# Patient Record
Sex: FEMALE | NOT HISPANIC OR LATINO | Employment: FULL TIME | URBAN - METROPOLITAN AREA
[De-identification: names, ages, dates, MRNs, and addresses within clinical notes are randomized per-mention and may not be internally consistent; named-entity substitution may affect disease eponyms.]

---

## 2017-01-03 ENCOUNTER — OFFICE VISIT (OUTPATIENT)
Dept: FAMILY MEDICINE | Facility: CLINIC | Age: 37
End: 2017-01-03
Payer: COMMERCIAL

## 2017-01-03 VITALS
DIASTOLIC BLOOD PRESSURE: 64 MMHG | TEMPERATURE: 99.4 F | BODY MASS INDEX: 21.22 KG/M2 | SYSTOLIC BLOOD PRESSURE: 112 MMHG | WEIGHT: 140 LBS | RESPIRATION RATE: 16 BRPM | HEART RATE: 68 BPM | HEIGHT: 68 IN

## 2017-01-03 DIAGNOSIS — N89.8 VAGINAL ITCHING: Primary | ICD-10-CM

## 2017-01-03 DIAGNOSIS — B37.31 YEAST INFECTION OF THE VAGINA: ICD-10-CM

## 2017-01-03 DIAGNOSIS — N76.0 BACTERIAL VAGINOSIS: ICD-10-CM

## 2017-01-03 DIAGNOSIS — B96.89 BACTERIAL VAGINOSIS: ICD-10-CM

## 2017-01-03 LAB
ALBUMIN UR-MCNC: NEGATIVE MG/DL
APPEARANCE UR: CLEAR
BILIRUB UR QL STRIP: NEGATIVE
COLOR UR AUTO: YELLOW
GLUCOSE UR STRIP-MCNC: NEGATIVE MG/DL
HGB UR QL STRIP: NEGATIVE
KETONES UR STRIP-MCNC: NEGATIVE MG/DL
LEUKOCYTE ESTERASE UR QL STRIP: NEGATIVE
MICRO REPORT STATUS: ABNORMAL
NITRATE UR QL: NEGATIVE
PH UR STRIP: 6.5 PH (ref 5–7)
SP GR UR STRIP: 1.01 (ref 1–1.03)
SPECIMEN SOURCE: ABNORMAL
URN SPEC COLLECT METH UR: NORMAL
UROBILINOGEN UR STRIP-ACNC: 0.2 EU/DL (ref 0.2–1)
WET PREP SPEC: ABNORMAL

## 2017-01-03 PROCEDURE — 99213 OFFICE O/P EST LOW 20 MIN: CPT | Performed by: PHYSICIAN ASSISTANT

## 2017-01-03 PROCEDURE — 87210 SMEAR WET MOUNT SALINE/INK: CPT | Performed by: PHYSICIAN ASSISTANT

## 2017-01-03 PROCEDURE — 81003 URINALYSIS AUTO W/O SCOPE: CPT | Performed by: PHYSICIAN ASSISTANT

## 2017-01-03 RX ORDER — METRONIDAZOLE 500 MG/1
500 TABLET ORAL 2 TIMES DAILY
Qty: 14 TABLET | Refills: 0 | Status: SHIPPED | OUTPATIENT
Start: 2017-01-03 | End: 2017-04-06

## 2017-01-03 RX ORDER — FLUCONAZOLE 150 MG/1
150 TABLET ORAL ONCE
Qty: 1 TABLET | Refills: 0 | Status: SHIPPED | OUTPATIENT
Start: 2017-01-03 | End: 2017-01-03

## 2017-01-03 NOTE — MR AVS SNAPSHOT
"              After Visit Summary   1/3/2017    Jenny Alcazar    MRN: [id number]           Patient Information     Date Of Birth          1980        Visit Information        Provider Department      1/3/2017 4:00 PM Edwige Rosas PA-C [facility]        Today's Diagnoses     Vaginal itching    -  1     Yeast infection of the vagina         Bacterial vaginosis            Follow-ups after your visit        Follow-up notes from your care team     Return if symptoms worsen or fail to improve.      Who to contact     If you have questions or need follow up information about today's clinic visit or your schedule please contact [facility] directly at [phone].  Normal or non-critical lab and imaging results will be communicated to you by MyChart, letter or phone within 4 business days after the clinic has received the results. If you do not hear from us within 7 days, please contact the clinic through MyChart or phone. If you have a critical or abnormal lab result, we will notify you by phone as soon as possible.  Submit refill requests through [employer] or call your pharmacy and they will forward the refill request to us. Please allow 3 business days for your refill to be completed.          Additional Information About Your Visit        MyChart Information     [employer] lets you send messages to your doctor, view your test results, renew your prescriptions, schedule appointments and more. To sign up, go to [email or web address] . Click on \"Log in\" on the left side of the screen, which will take you to the Welcome page. Then click on \"Sign up Now\" on the right side of the page.     You will be asked to enter the access code listed below, as well as some personal information. Please follow the directions to create your username and password.     Your access code is: [email or web address]  Expires: 4/3/2017  5:08 PM     Your access code will  in 90 days. If you need help " "or a new code, please call your [place] clinic or [phone].        Care EveryWhere ID     This is your Care EveryWhere ID. This could be used by other organizations to access your [place] medical records  [id number]        Your Vitals Were     Pulse Temperature Respirations Height BMI (Body Mass Index) Last Period    68 99.4  F (37.4  C) 16 5' 8\" (1.727 m) 21.29 kg/m2 12/25/2016 (Approximate)       Blood Pressure from Last 3 Encounters:   01/03/17 112/64   01/13/16 115/70   08/27/15 109/70    Weight from Last 3 Encounters:   01/03/17 140 lb (63.504 kg)   01/13/16 137 lb (62.143 kg)   08/27/15 135 lb (61.236 kg)              We Performed the Following     *UA reflex to Microscopic     Wet prep          Today's Medication Changes          These changes are accurate as of: 1/3/17  5:08 PM.  If you have any questions, ask your nurse or doctor.               Start taking these medicines.        Dose/Directions    fluconazole 150 MG tablet   Commonly known as:  DIFLUCAN   Used for:  Yeast infection of the vagina   Started by:  Edwige Rosas PA-C        Dose:  150 mg   Take 1 tablet (150 mg) by mouth once for 1 dose   Quantity:  1 tablet   Refills:  0       metroNIDAZOLE 500 MG tablet   Commonly known as:  FLAGYL   Used for:  Bacterial vaginosis   Started by:  Edwige Rosas PA-C        Dose:  500 mg   Take 1 tablet (500 mg) by mouth 2 times daily   Quantity:  14 tablet   Refills:  0            Where to get your medicines      These medications were sent to [facility]/pharmacy #[id number] - [place], [place] - [address]  [address] [facility] [place] [address]     Phone:  [phone]    - fluconazole 150 MG tablet  - metroNIDAZOLE 500 MG tablet             Primary Care Provider Office Phone # Fax #    MEÑO Jaquez [phone]       [address]  [place] [address]        Thank you!     Thank you for choosing [facility] [place]  " for your care. Our goal is always to provide you with excellent care. Hearing back from our patients is one way we can continue to improve our services. Please take a few minutes to complete the written survey that you may receive in the mail after your visit with us. Thank you!             Your Updated Medication List - Protect others around you: Learn how to safely use, store and throw away your medicines at www.disposemymeds.org.          This list is accurate as of: 1/3/17  5:08 PM.  Always use your most recent med list.                   Brand Name Dispense Instructions for use    ferrous fumarate 65 mg ([place]. FE)-Vitamin C 125 mg  MG Tabs tablet    VITRON C    180 tablet    Take 1 tablet by mouth 2 times daily       fluconazole 150 MG tablet    DIFLUCAN    1 tablet    Take 1 tablet (150 mg) by mouth once for 1 dose       metroNIDAZOLE 500 MG tablet    FLAGYL    14 tablet    Take 1 tablet (500 mg) by mouth 2 times daily       QC WOMENS DAILY MULTIvitamin Tabs     90 tablet    Take 1 tablet by mouth daily

## 2017-01-03 NOTE — PROGRESS NOTES
SUBJECTIVE:                                                    Jenny Alcazar is a 36 year old female who presents to clinic today for the following health issues:      Vaginal Symptoms     Onset: 2-3 weeks     Description:  Vaginal Discharge: white -   Itching (Pruritis): YES  Burning sensation:  YES  Odor: YES    Accompanying Signs & Symptoms:  Pain with Urination: minimal   Abdominal Pain: no   Fever: no   History:   Sexually active: no   New Partner: no   Possibility of Pregnancy:  No    Precipitating factors:   Recent Antibiotic Use: no     Alleviating factors:  none     Therapies Tried and outcome: none        -------------------------------------    Problem list and histories reviewed & adjusted, as indicated.  Additional history: as documented    Patient Active Problem List   Diagnosis     CARDIOVASCULAR SCREENING; LDL GOAL LESS THAN 160     Iron deficiency anemia     Past Surgical History   Procedure Laterality Date     Hemorrhoid surgery         Social History   Substance Use Topics     Smoking status: Never Smoker      Smokeless tobacco: Never Used     Alcohol Use: No     Family History   Problem Relation Age of Onset     Hypertension Father          Current Outpatient Prescriptions   Medication Sig Dispense Refill     fluconazole (DIFLUCAN) 150 MG tablet Take 1 tablet (150 mg) by mouth once for 1 dose 1 tablet 0     metroNIDAZOLE (FLAGYL) 500 MG tablet Take 1 tablet (500 mg) by mouth 2 times daily 14 tablet 0     ferrous fumarate 65 mg, [place]. FE,-Vitamin C 125 mg (VITRON C)  MG TABS Take 1 tablet by mouth 2 times daily 180 tablet 1     Multiple Vitamins-Minerals (QC WOMENS DAILY MULTIVITAMIN) TABS Take 1 tablet by mouth daily 90 tablet 1     No Known Allergies  Labs reviewed in EPIC  Problem list, Medication list, Allergies, and Medical/Social/Surgical histories reviewed in [facility] and updated as appropriate.    Social History     Social History     Marital Status:      Spouse Name:       "Number of Children: 0     Years of Education: N/A     Occupational History     production line      Social History Main Topics     Smoking status: Never Smoker      Smokeless tobacco: Never Used     Alcohol Use: No     Drug Use: No     Sexual Activity: No      Comment: virgin     Other Topics Concern     None     Social History Narrative       10 point review of systems negative other than symptoms noted above.   Constitutional, HEENT, CV, pulmonary, GI, , MS, Endo, Psych systems are all negative, except as otherwise noted.       OBJECTIVE:  /64 mmHg  Pulse 68  Temp(Src) 99.4  F (37.4  C)  Resp 16  Ht 5' 8\" (1.727 m)  Wt 140 lb (63.504 kg)  BMI 21.29 kg/m2  LMP 12/25/2016 (Approximate)  CONSTITUTIONAL: Alert, well-nourished, well-groomed, NAD  RESP: Lungs CTA. No wheeze, rhonchi, rales. Normal effort on room air. Equal lung sounds bilaterally.   CV: HRRR, normal S1, S2. No MRG. No peripheral edema.  DERM: No rashes or suspicious lesions  GI: Abdomen flat. BS x 4. No TTP. No HSM or masses. No CVAT      Diagnostic Tests:  Results for orders placed or performed in visit on 01/03/17   *UA reflex to Microscopic   Result Value Ref Range    Color Urine Yellow     Appearance Urine Clear     Glucose Urine Negative NEG mg/dL    Bilirubin Urine Negative NEG    Ketones Urine Negative NEG mg/dL    Specific Gravity Urine 1.010 1.003 - 1.035    Blood Urine Negative NEG    pH Urine 6.5 5.0 - 7.0 pH    Protein Albumin Urine Negative NEG mg/dL    Urobilinogen Urine 0.2 0.2 - 1.0 EU/dL    Nitrite Urine Negative NEG    Leukocyte Esterase Urine Negative NEG    Source Midstream Urine    Wet prep   Result Value Ref Range    Specimen Description Vagina     Wet Prep No Trichomonas seen  Clue cells seen  Yeast seen   (A)     Micro Report Status FINAL 01/03/2017          ASSESSMENT/PLAN:  (L29.8) Vaginal itching  (primary encounter diagnosis)  Comment:   Plan: *UA reflex to Microscopic, Wet prep            (B37.3) Yeast " infection of the vagina  Comment:   Plan: fluconazole (DIFLUCAN) 150 MG tablet            (N76.0,  B96.89) Bacterial vaginosis  Comment:   Plan: metroNIDAZOLE (FLAGYL) 500 MG tablet              FOLLOW-UP: Routinely and sooner as needed.  The patient agrees with this assessment and plan and agrees to call or return to the clinic with any questions or concerns or if their condition worsens.    SHERRI Medellin, PA-C  [facility]

## 2017-01-03 NOTE — NURSING NOTE
"Chief Complaint   Patient presents with     Vaginal Problem       Initial /64 mmHg  Pulse 68  Temp(Src) 99.4  F (37.4  C)  Resp 16  Ht 5' 8\" (1.727 m)  Wt 140 lb (63.504 kg)  BMI 21.29 kg/m2  LMP 12/25/2016 (Approximate) Estimated body mass index is 21.29 kg/(m^2) as calculated from the following:    Height as of this encounter: 5' 8\" (1.727 m).    Weight as of this encounter: 140 lb (63.504 kg).  BP completed using cuff size: regular. EVERETTE Romero LPN      "

## 2017-04-06 ENCOUNTER — OFFICE VISIT (OUTPATIENT)
Dept: FAMILY MEDICINE | Facility: CLINIC | Age: 37
End: 2017-04-06
Payer: COMMERCIAL

## 2017-04-06 ENCOUNTER — RADIANT APPOINTMENT (OUTPATIENT)
Dept: GENERAL RADIOLOGY | Facility: CLINIC | Age: 37
End: 2017-04-06
Attending: FAMILY MEDICINE
Payer: COMMERCIAL

## 2017-04-06 VITALS
HEART RATE: 72 BPM | WEIGHT: 139 LBS | SYSTOLIC BLOOD PRESSURE: 104 MMHG | TEMPERATURE: 98.5 F | DIASTOLIC BLOOD PRESSURE: 60 MMHG | BODY MASS INDEX: 21.13 KG/M2

## 2017-04-06 DIAGNOSIS — M54.6 THORACIC BACK PAIN, UNSPECIFIED BACK PAIN LATERALITY, UNSPECIFIED CHRONICITY: ICD-10-CM

## 2017-04-06 DIAGNOSIS — D50.9 IRON DEFICIENCY ANEMIA, UNSPECIFIED IRON DEFICIENCY ANEMIA TYPE: ICD-10-CM

## 2017-04-06 DIAGNOSIS — Z91.018 FOOD ALLERGY: ICD-10-CM

## 2017-04-06 DIAGNOSIS — K12.0 APHTHOUS ULCER: Primary | ICD-10-CM

## 2017-04-06 LAB
HGB BLD-MCNC: 10.5 G/DL (ref 11.7–15.7)
VIT B12 SERPL-MCNC: 508 PG/ML (ref 193–986)

## 2017-04-06 PROCEDURE — 83550 IRON BINDING TEST: CPT | Performed by: FAMILY MEDICINE

## 2017-04-06 PROCEDURE — 99214 OFFICE O/P EST MOD 30 MIN: CPT | Performed by: FAMILY MEDICINE

## 2017-04-06 PROCEDURE — 86003 ALLG SPEC IGE CRUDE XTRC EA: CPT | Performed by: FAMILY MEDICINE

## 2017-04-06 PROCEDURE — 85018 HEMOGLOBIN: CPT | Performed by: FAMILY MEDICINE

## 2017-04-06 PROCEDURE — 83540 ASSAY OF IRON: CPT | Performed by: FAMILY MEDICINE

## 2017-04-06 PROCEDURE — 36415 COLL VENOUS BLD VENIPUNCTURE: CPT | Performed by: FAMILY MEDICINE

## 2017-04-06 PROCEDURE — 82607 VITAMIN B-12: CPT | Performed by: FAMILY MEDICINE

## 2017-04-06 PROCEDURE — 82306 VITAMIN D 25 HYDROXY: CPT | Performed by: FAMILY MEDICINE

## 2017-04-06 PROCEDURE — 72072 X-RAY EXAM THORAC SPINE 3VWS: CPT

## 2017-04-06 PROCEDURE — 84207 ASSAY OF VITAMIN B-6: CPT | Mod: 90 | Performed by: FAMILY MEDICINE

## 2017-04-06 PROCEDURE — 99000 SPECIMEN HANDLING OFFICE-LAB: CPT | Performed by: FAMILY MEDICINE

## 2017-04-06 RX ORDER — TRIAMCINOLONE ACETONIDE 0.1 %
PASTE (GRAM) DENTAL 2 TIMES DAILY
Qty: 5 G | Refills: 1 | Status: SHIPPED | OUTPATIENT
Start: 2017-04-06 | End: 2017-07-06

## 2017-04-06 RX ORDER — FERROUS SULFATE 325(65) MG
325 TABLET ORAL
Qty: 30 TABLET | Refills: 11 | Status: SHIPPED | OUTPATIENT
Start: 2017-04-06 | End: 2017-04-13

## 2017-04-06 NOTE — NURSING NOTE
"Chief Complaint   Patient presents with     Mouth Problem     Back Pain       Initial /60  Pulse 72  Temp 98.5  F (36.9  C) (Tympanic)  Wt 139 lb (63 kg)  LMP 03/16/2017 (Approximate)  BMI 21.13 kg/m2 Estimated body mass index is 21.13 kg/(m^2) as calculated from the following:    Height as of 1/3/17: 5' 8\" (1.727 m).    Weight as of this encounter: 139 lb (63 kg).  Medication Reconciliation: complete    No current outpatient prescriptions on file.       Medardo ALVARADO CMA  "

## 2017-04-06 NOTE — MR AVS SNAPSHOT
"              After Visit Summary   2017    Jenny Alcazar    MRN: [id number]           Patient Information     Date Of Birth          1980        Visit Information        Provider Department      2017 2:00 PM Wesley Sutton MD [facility] [place]        Today's Diagnoses     Aphthous ulcer    -  1    Food allergy        Thoracic back pain, unspecified back pain laterality, unspecified chronicity        Iron deficiency anemia, unspecified iron deficiency anemia type           Follow-ups after your visit        Who to contact     If you have questions or need follow up information about today's clinic visit or your schedule please contact [facility] directly at [phone].  Normal or non-critical lab and imaging results will be communicated to you by MyChart, letter or phone within 4 business days after the clinic has received the results. If you do not hear from us within 7 days, please contact the clinic through MyChart or phone. If you have a critical or abnormal lab result, we will notify you by phone as soon as possible.  Submit refill requests through [employer] or call your pharmacy and they will forward the refill request to us. Please allow 3 business days for your refill to be completed.          Additional Information About Your Visit        MyChart Information     [employer] lets you send messages to your doctor, view your test results, renew your prescriptions, schedule appointments and more. To sign up, go to [email or web address] . Click on \"Log in\" on the left side of the screen, which will take you to the Welcome page. Then click on \"Sign up Now\" on the right side of the page.     You will be asked to enter the access code listed below, as well as some personal information. Please follow the directions to create your username and password.     Your access code is: [email or web address]  Expires: 2017  3:23 PM     Your access code will  in 90 days. If you need help " or a new code, please call your [facility] or [phone].        Care EveryWhere ID     This is your Care EveryWhere ID. This could be used by other organizations to access your [place] medical records  [id number]        Your Vitals Were     Pulse Temperature Last Period BMI (Body Mass Index)          72 98.5  F (36.9  C) (Tympanic) 03/16/2017 (Approximate) 21.13 kg/m2         Blood Pressure from Last 3 Encounters:   04/06/17 104/60   01/03/17 112/64   01/13/16 115/70    Weight from Last 3 Encounters:   04/06/17 139 lb (63 kg)   01/03/17 140 lb (63.5 kg)   01/13/16 137 lb (62.1 kg)              We Performed the Following     Allergy adult food panel     Hemoglobin     Iron and iron binding capacity     Vitamin B12     Vitamin B6     Vitamin D Deficiency          Today's Medication Changes          These changes are accurate as of: 4/6/17  3:23 PM.  If you have any questions, ask your nurse or doctor.               Start taking these medicines.        Dose/Directions    ferrous sulfate 325 (65 FE) MG tablet   Commonly known as:  IRON   Used for:  Iron deficiency anemia, unspecified iron deficiency anemia type   Started by:  Wesley Sutton MD        Dose:  325 mg   Take 1 tablet (325 mg) by mouth daily (with breakfast)   Quantity:  30 tablet   Refills:  11       triamcinolone 0.1 % paste   Commonly known as:  KENALOG   Used for:  Aphthous ulcer   Started by:  Wesley Sutton MD        Take by mouth 2 times daily   Quantity:  5 g   Refills:  1            Where to get your medicines      These medications were sent to [facility]/pharmacy #[id number] - [place], [place] - [address]  [address] [place] [address] [place] [address]     Phone:  [phone]     ferrous sulfate 325 (65 FE) MG tablet    triamcinolone 0.1 % paste                Primary Care Provider Office Phone # Fax #    Wesley Sutton [phone]       [facility] [address] [place] [address] [address]  [place] [address]        Thank you!      Thank you for choosing [facility] [place]  for your care. Our goal is always to provide you with excellent care. Hearing back from our patients is one way we can continue to improve our services. Please take a few minutes to complete the written survey that you may receive in the mail after your visit with us. Thank you!             Your Updated Medication List - Protect others around you: Learn how to safely use, store and throw away your medicines at www.disposemymeds.org.          This list is accurate as of: 4/6/17  3:23 PM.  Always use your most recent med list.                   Brand Name Dispense Instructions for use    ferrous sulfate 325 (65 FE) MG tablet    IRON    30 tablet    Take 1 tablet (325 mg) by mouth daily (with breakfast)       triamcinolone 0.1 % paste    KENALOG    5 g    Take by mouth 2 times daily

## 2017-04-06 NOTE — PROGRESS NOTES
SUBJECTIVE:                                                    Jenny Alcazar is a 36 year old female who presents to clinic today for the following health issues:    Concern - mouth sores     Onset: ? 4 months    Some time she feel when he eat some spreads in the am she develop some ulcers. Some time they are very painful.    She has troed mouth wash and slat water gargels    Description:   Blisters in mouth/lips    Intensity: mild    Progression of Symptoms:  waxing and waning         Therapies Tried and outcome: warm salt water       Back Pain  Duration of complaint: 2 months   Pain in the upper thoracic area, no numbness and tingling  Feels some time when she bend it cause pain, no fall or trauma, no weakness     Problem list and histories reviewed & adjusted, as indicated.          Reviewed and updated as needed this visit by clinical staff  Tobacco  Allergies  Meds       Reviewed and updated as needed this visit by Provider         ROS:  C: NEGATIVE for fever, chills, change in weight  E/M: NEGATIVE for ear, mouth and throat problems  R: NEGATIVE for significant cough or SOB  CV: NEGATIVE for chest pain, palpitations or peripheral edema  MUSCULOSKELETAL: back pain.    OBJECTIVE:                                                    /60  Pulse 72  Temp 98.5  F (36.9  C) (Tympanic)  Wt 139 lb (63 kg)  LMP 03/16/2017 (Approximate)  BMI 21.13 kg/m2  Body mass index is 21.13 kg/(m^2).   GENERAL: healthy, alert, well nourished, well hydrated, no distress  NECK: no tenderness, no adenopathy, no asymmetry, no masses, no stiffness; thyroid- normal to palpation  RESP: lungs clear to auscultation - no rales, no rhonchi, no wheezes  CV: regular rates and rhythm, normal S1 S2, no S3 or S4 and no murmur, no click or rub -  BACK: no CVA tenderness, no paralumbar tenderness  Some tenderness in the upper back.  Mouth has small aphthous ulcer     ASSESSMENT/PLAN:                                                         ICD-10-CM    1. Aphthous ulcer K12.0 Allergy adult food panel     triamcinolone (KENALOG) 0.1 % paste     Vitamin D Deficiency     Vitamin B12     Vitamin B6     Iron and iron binding capacity     Hemoglobin   2. Food allergy Z91.018 Allergy adult food panel   3. Thoracic back pain, unspecified back pain laterality, unspecified chronicity M54.6 XR Thoracic Spine 3 Views     Patient has small aphthous ulcers which are healing, suggested mouth hygiene and avoid food which aggravate the symptoms.  Will get some labs including food allergy panel along with vit and will follow up on them.  thoracic pain most likely positional she has some degree of bony tenderness will get xray and follow up on that. dicussed stretching exeresis along with the ibuprofen as needed.  Xray reviewed, which is normal. No acute abnormality seen.  Hemoglobin indicate levels of 10.2, which is low but stable. advice iron tab daily, increase green vegetables. Rest of the labs are pending, will follow up on that.    Wesley Sutton MD  [facility]

## 2017-04-07 LAB
DEPRECATED CALCIDIOL+CALCIFEROL SERPL-MC: 10 UG/L (ref 20–75)
IRON SATN MFR SERPL: 6 % (ref 15–46)
IRON SERPL-MCNC: 23 UG/DL (ref 35–180)
TIBC SERPL-MCNC: 410 UG/DL (ref 240–430)

## 2017-04-08 LAB — VIT B6 SERPL-MCNC: 43.9 NG/ML

## 2017-04-10 LAB
CLAM IGE QN: ABNORMAL KU(A)/L
CODFISH IGE QN: ABNORMAL KU(A)/L
CORN IGE QN: ABNORMAL KU(A)/L
COW MILK IGE QN: 0.17 KU/L
EGG WHITE IGE QN: ABNORMAL KU(A)/L
PEANUT IGE QN: ABNORMAL KU(A)/L
SCALLOP IGE QN: ABNORMAL KU(A)/L
SHRIMP IGE QN: ABNORMAL KU(A)/L
SOYBEAN IGE QN: ABNORMAL KU(A)/L
WALNUT IGE QN: ABNORMAL KU(A)/L
WHEAT IGE QN: ABNORMAL KU(A)/L

## 2017-04-13 ENCOUNTER — OFFICE VISIT (OUTPATIENT)
Dept: FAMILY MEDICINE | Facility: CLINIC | Age: 37
End: 2017-04-13
Payer: COMMERCIAL

## 2017-04-13 VITALS
BODY MASS INDEX: 21.29 KG/M2 | DIASTOLIC BLOOD PRESSURE: 60 MMHG | HEART RATE: 56 BPM | WEIGHT: 140 LBS | SYSTOLIC BLOOD PRESSURE: 96 MMHG | TEMPERATURE: 98.3 F

## 2017-04-13 DIAGNOSIS — E55.9 VITAMIN D DEFICIENCY: ICD-10-CM

## 2017-04-13 DIAGNOSIS — D50.9 IRON DEFICIENCY ANEMIA, UNSPECIFIED IRON DEFICIENCY ANEMIA TYPE: Primary | ICD-10-CM

## 2017-04-13 PROCEDURE — 99213 OFFICE O/P EST LOW 20 MIN: CPT | Performed by: FAMILY MEDICINE

## 2017-04-13 RX ORDER — FERROUS GLUCONATE 324(38)MG
324 TABLET ORAL
Qty: 100 TABLET | Refills: 3 | Status: SHIPPED | OUTPATIENT
Start: 2017-04-13 | End: 2017-10-11

## 2017-04-13 NOTE — PROGRESS NOTES
SUBJECTIVE:                                                    Jenny Alcazar is a 36 year old female who presents to clinic today for the following health issues:      Concern - discuss test results - hasnt started iron tab yet        Patient came today for lab follow up, she has complains of fatigue and tiredness, her hb  labwas done along with vit D and iron levels.  She denies nay other complains.  Blood allergy pane was done as , it came back faintly positive for diary but she denies nay issues with such products.    Problem list and histories reviewed & adjusted, as indicated.      Patient Active Problem List   Diagnosis     CARDIOVASCULAR SCREENING; LDL GOAL LESS THAN 160     Iron deficiency anemia     Vitamin D deficiency     Past Surgical History:   Procedure Laterality Date     HEMORRHOID SURGERY         Social History   Substance Use Topics     Smoking status: Never Smoker     Smokeless tobacco: Never Used     Alcohol use No     Family History   Problem Relation Age of Onset     Hypertension Father          Current Outpatient Prescriptions   Medication Sig Dispense Refill     cholecalciferol (VITAMIN D) 1000 UNIT tablet Take 6 tablets (6,000 Units) by mouth daily For 6 weeks then 2 tab daily after that 100 tablet 3     ferrous gluconate (FERGON) 324 (38 FE) MG tablet Take 1 tablet (324 mg) by mouth daily (with breakfast) 100 tablet 3     triamcinolone (KENALOG) 0.1 % paste Take by mouth 2 times daily (Patient not taking: Reported on 4/13/2017) 5 g 1       Reviewed and updated as needed this visit by clinical staff  Tobacco  Allergies  Meds       Reviewed and updated as needed this visit by Provider         ROS:  C: NEGATIVE for fever, chills, change in weight  E/M: NEGATIVE for ear, mouth and throat problems  R: NEGATIVE for significant cough or SOB  CV: NEGATIVE for chest pain, palpitations or peripheral edema    OBJECTIVE:                                                    BP 96/60  Pulse 56  Temp  98.3  F (36.8  C) (Tympanic)  Wt 140 lb (63.5 kg)  LMP 04/08/2017 (Exact Date)  BMI 21.29 kg/m2  Body mass index is 21.29 kg/(m^2).   GENERAL: healthy, alert, well nourished, well hydrated, no distress  HENT: ear canals- normal; TMs- normal; Nose- normal; Mouth- no ulcers, no lesions  NECK: no tenderness, no adenopathy, no asymmetry, no masses, no stiffness; thyroid- normal to palpation  RESP: lungs clear to auscultation - no rales, no rhonchi, no wheezes  CV: regular rates and rhythm, normal S1 S2, no S3 or S4 and no murmur, no click or rub -         ASSESSMENT/PLAN:                                                        ICD-10-CM    1. Iron deficiency anemia, unspecified iron deficiency anemia type D50.9 ferrous gluconate (FERGON) 324 (38 FE) MG tablet   2. Vitamin D deficiency E55.9 cholecalciferol (VITAMIN D) 1000 UNIT tablet   Dicussed labs with the patient. Her vit D is low along with low hemoglobin and iron levels, she is advised to start taking iron and vit D.  Follow up in 6-8 weeks to see if any improvement, avoid spicy food and see if any particular food cause any mouth ulcer symptoms.    Wesley Sutton MD  [facility]

## 2017-04-13 NOTE — NURSING NOTE
"Chief Complaint   Patient presents with     Results       Initial BP 96/60  Pulse 56  Temp 98.3  F (36.8  C) (Tympanic)  Wt 140 lb (63.5 kg)  LMP 04/08/2017 (Exact Date)  BMI 21.29 kg/m2 Estimated body mass index is 21.29 kg/(m^2) as calculated from the following:    Height as of 1/3/17: 5' 8\" (1.727 m).    Weight as of this encounter: 140 lb (63.5 kg).  Medication Reconciliation: complete    Current Outpatient Prescriptions   Medication Sig Dispense Refill     triamcinolone (KENALOG) 0.1 % paste Take by mouth 2 times daily (Patient not taking: Reported on 4/13/2017) 5 g 1     ferrous sulfate (IRON) 325 (65 FE) MG tablet Take 1 tablet (325 mg) by mouth daily (with breakfast) (Patient not taking: Reported on 4/13/2017) 30 tablet 11       Medardo ALVARADO CMA  "

## 2017-04-13 NOTE — MR AVS SNAPSHOT
"              After Visit Summary   2017    Jenny Alcazar    MRN: [id number]           Patient Information     Date Of Birth          1980        Visit Information        Provider Department      2017 2:00 PM Wesley Sutton MD [facility] [place]        Today's Diagnoses     Iron deficiency anemia, unspecified iron deficiency anemia type    -  1    Vitamin D deficiency           Follow-ups after your visit        Who to contact     If you have questions or need follow up information about today's clinic visit or your schedule please contact [facility] [place] directly at [phone].  Normal or non-critical lab and imaging results will be communicated to you by Glovicohart, letter or phone within 4 business days after the clinic has received the results. If you do not hear from us within 7 days, please contact the clinic through Glovicohart or phone. If you have a critical or abnormal lab result, we will notify you by phone as soon as possible.  Submit refill requests through ServiceFrame or call your pharmacy and they will forward the refill request to us. Please allow 3 business days for your refill to be completed.          Additional Information About Your Visit        MyChart Information     ServiceFrame lets you send messages to your doctor, view your test results, renew your prescriptions, schedule appointments and more. To sign up, go to [email or web address] . Click on \"Log in\" on the left side of the screen, which will take you to the Welcome page. Then click on \"Sign up Now\" on the right side of the page.     You will be asked to enter the access code listed below, as well as some personal information. Please follow the directions to create your username and password.     Your access code is: [email or web address]  Expires: 2017  3:23 PM     Your access code will  in 90 days. If you need help or a new code, please call your [facility] or [phone].        Care EveryWhere ID     " This is your Care EveryWhere ID. This could be used by other organizations to access your [place] medical records  [id number]        Your Vitals Were     Pulse Temperature Last Period BMI (Body Mass Index)          56 98.3  F (36.8  C) (Tympanic) 04/08/2017 (Exact Date) 21.29 kg/m2         Blood Pressure from Last 3 Encounters:   04/13/17 96/60   04/06/17 104/60   01/03/17 112/64    Weight from Last 3 Encounters:   04/13/17 140 lb (63.5 kg)   04/06/17 139 lb (63 kg)   01/03/17 140 lb (63.5 kg)              Today, you had the following     No orders found for display         Today's Medication Changes          These changes are accurate as of: 4/13/17  2:13 PM.  If you have any questions, ask your nurse or doctor.               Start taking these medicines.        Dose/Directions    cholecalciferol 1000 UNIT tablet   Commonly known as:  vitamin D   Used for:  Vitamin D deficiency   Started by:  Wesley Sutton MD        Dose:  6000 Units   Take 6 tablets (6,000 Units) by mouth daily For 6 weeks then 2 tab daily after that   Quantity:  100 tablet   Refills:  3       ferrous gluconate 324 (38 FE) MG tablet   Commonly known as:  FERGON   Used for:  Iron deficiency anemia, unspecified iron deficiency anemia type   Started by:  Wesley Sutton MD        Dose:  324 mg   Take 1 tablet (324 mg) by mouth daily (with breakfast)   Quantity:  100 tablet   Refills:  3            Where to get your medicines      These medications were sent to [facility]/pharmacy #[id number] - [place], [place] - [address] [place] [address]     Phone:  [phone]     cholecalciferol 1000 UNIT tablet    ferrous gluconate 324 (38 FE) MG tablet                Primary Care Provider Office Phone # Fax #    Wesley Sutton [phone]       [address]  [place] [address]        Thank you!     Thank you for choosing [facility]  for your care. Our goal is  always to provide you with excellent care. Hearing back from our patients is one way we can continue to improve our services. Please take a few minutes to complete the written survey that you may receive in the mail after your visit with us. Thank you!             Your Updated Medication List - Protect others around you: Learn how to safely use, store and throw away your medicines at www.disposemymeds.org.          This list is accurate as of: 4/13/17  2:13 PM.  Always use your most recent med list.                   Brand Name Dispense Instructions for use    cholecalciferol 1000 UNIT tablet    vitamin D    100 tablet    Take 6 tablets (6,000 Units) by mouth daily For 6 weeks then 2 tab daily after that       ferrous gluconate 324 (38 FE) MG tablet    FERGON    100 tablet    Take 1 tablet (324 mg) by mouth daily (with breakfast)       ferrous sulfate 325 (65 FE) MG tablet    IRON    30 tablet    Take 1 tablet (325 mg) by mouth daily (with breakfast)       triamcinolone 0.1 % paste    KENALOG    5 g    Take by mouth 2 times daily

## 2017-04-26 ENCOUNTER — TELEPHONE (OUTPATIENT)
Dept: FAMILY MEDICINE | Facility: CLINIC | Age: 37
End: 2017-04-26

## 2017-04-27 NOTE — TELEPHONE ENCOUNTER
Call Regarding Preventive Health Screening Cervical/PAP    Attempt 1    Message on voicemail     Comments:       Outreach   Enriqueta Patterson

## 2017-05-02 NOTE — TELEPHONE ENCOUNTER
FYI: Patient was not understanding what the annual physical/pap is. Per patient will check in with provider.    Outreach ,  Priscilla Cabezas

## 2017-07-06 ENCOUNTER — OFFICE VISIT (OUTPATIENT)
Dept: OBGYN | Facility: CLINIC | Age: 37
End: 2017-07-06
Attending: FAMILY MEDICINE
Payer: COMMERCIAL

## 2017-07-06 ENCOUNTER — OFFICE VISIT (OUTPATIENT)
Dept: FAMILY MEDICINE | Facility: CLINIC | Age: 37
End: 2017-07-06
Payer: COMMERCIAL

## 2017-07-06 VITALS — SYSTOLIC BLOOD PRESSURE: 98 MMHG | BODY MASS INDEX: 20.98 KG/M2 | DIASTOLIC BLOOD PRESSURE: 56 MMHG | WEIGHT: 138 LBS

## 2017-07-06 VITALS
BODY MASS INDEX: 20.98 KG/M2 | SYSTOLIC BLOOD PRESSURE: 102 MMHG | WEIGHT: 138 LBS | HEART RATE: 56 BPM | DIASTOLIC BLOOD PRESSURE: 60 MMHG | OXYGEN SATURATION: 98 % | TEMPERATURE: 98.3 F

## 2017-07-06 DIAGNOSIS — E55.9 VITAMIN D DEFICIENCY: Primary | ICD-10-CM

## 2017-07-06 DIAGNOSIS — N94.10 DYSPAREUNIA, FEMALE: ICD-10-CM

## 2017-07-06 DIAGNOSIS — A09 TRAVELER'S DIARRHEA: ICD-10-CM

## 2017-07-06 DIAGNOSIS — N90.810 FEMALE CIRCUMCISION: ICD-10-CM

## 2017-07-06 DIAGNOSIS — D50.9 IRON DEFICIENCY ANEMIA, UNSPECIFIED IRON DEFICIENCY ANEMIA TYPE: ICD-10-CM

## 2017-07-06 DIAGNOSIS — Z71.1 PHYSICALLY WELL BUT WORRIED: Primary | ICD-10-CM

## 2017-07-06 LAB
ERYTHROCYTE [DISTWIDTH] IN BLOOD BY AUTOMATED COUNT: 16.1 % (ref 10–15)
HCT VFR BLD AUTO: 37.3 % (ref 35–47)
HGB BLD-MCNC: 11.9 G/DL (ref 11.7–15.7)
IRON SATN MFR SERPL: 10 % (ref 15–46)
IRON SERPL-MCNC: 36 UG/DL (ref 35–180)
MCH RBC QN AUTO: 26.3 PG (ref 26.5–33)
MCHC RBC AUTO-ENTMCNC: 31.9 G/DL (ref 31.5–36.5)
MCV RBC AUTO: 83 FL (ref 78–100)
PLATELET # BLD AUTO: 219 10E9/L (ref 150–450)
RBC # BLD AUTO: 4.52 10E12/L (ref 3.8–5.2)
TIBC SERPL-MCNC: 364 UG/DL (ref 240–430)
WBC # BLD AUTO: 3.7 10E9/L (ref 4–11)

## 2017-07-06 PROCEDURE — 36415 COLL VENOUS BLD VENIPUNCTURE: CPT | Performed by: FAMILY MEDICINE

## 2017-07-06 PROCEDURE — 82306 VITAMIN D 25 HYDROXY: CPT | Performed by: FAMILY MEDICINE

## 2017-07-06 PROCEDURE — 99214 OFFICE O/P EST MOD 30 MIN: CPT | Performed by: FAMILY MEDICINE

## 2017-07-06 PROCEDURE — 83540 ASSAY OF IRON: CPT | Performed by: FAMILY MEDICINE

## 2017-07-06 PROCEDURE — 85027 COMPLETE CBC AUTOMATED: CPT | Performed by: FAMILY MEDICINE

## 2017-07-06 PROCEDURE — 83550 IRON BINDING TEST: CPT | Performed by: FAMILY MEDICINE

## 2017-07-06 PROCEDURE — 99202 OFFICE O/P NEW SF 15 MIN: CPT | Performed by: NURSE PRACTITIONER

## 2017-07-06 RX ORDER — CIPROFLOXACIN 500 MG/1
500 TABLET, FILM COATED ORAL 2 TIMES DAILY
Qty: 6 TABLET | Refills: 0 | Status: SHIPPED | OUTPATIENT
Start: 2017-07-06 | End: 2017-10-11

## 2017-07-06 NOTE — MR AVS SNAPSHOT
"              After Visit Summary   2017    Jenny Alcazar    MRN: [id number]           Patient Information     Date Of Birth          1980        Visit Information        Provider Department      2017 3:00 PM Bindu Andrews APRN CNP [facility]        Today's Diagnoses     Physically well but worried    -  1    Female circumcision           Follow-ups after your visit        Who to contact     If you have questions or need follow up information about today's clinic visit or your schedule please contact [facility] directly at [phone].  Normal or non-critical lab and imaging results will be communicated to you by WorkCasthart, letter or phone within 4 business days after the clinic has received the results. If you do not hear from us within 7 days, please contact the clinic through WorkCasthart or phone. If you have a critical or abnormal lab result, we will notify you by phone as soon as possible.  Submit refill requests through AppJet or call your pharmacy and they will forward the refill request to us. Please allow 3 business days for your refill to be completed.          Additional Information About Your Visit        MyChart Information     AppJet lets you send messages to your doctor, view your test results, renew your prescriptions, schedule appointments and more. To sign up, go to [email or web address] . Click on \"Log in\" on the left side of the screen, which will take you to the Welcome page. Then click on \"Sign up Now\" on the right side of the page.     You will be asked to enter the access code listed below, as well as some personal information. Please follow the directions to create your username and password.     Your access code is: [email or web address]  Expires: 10/4/2017 11:37 AM     Your access code will  in 90 days. If you need help or a new code, please call your [facility] or [phone].        Care EveryWhere ID     This is your Care " EveryWhere ID. This could be used by other organizations to access your [facility] medical records  [id number]        Your Vitals Were     Last Period Breastfeeding? BMI (Body Mass Index)             07/01/2017 (Approximate) No 20.98 kg/m2          Blood Pressure from Last 3 Encounters:   07/06/17 98/56   07/06/17 102/60   04/13/17 96/60    Weight from Last 3 Encounters:   07/06/17 138 lb (62.6 kg)   07/06/17 138 lb (62.6 kg)   04/13/17 140 lb (63.5 kg)              Today, you had the following     No orders found for display         Today's Medication Changes          These changes are accurate as of: 7/6/17  3:41 PM.  If you have any questions, ask your nurse or doctor.               Start taking these medicines.        Dose/Directions    ciprofloxacin 500 MG tablet   Commonly known as:  CIPRO   Used for:  Traveler's diarrhea   Started by:  Wseley Sutton MD        Dose:  500 mg   Take 1 tablet (500 mg) by mouth 2 times daily   Quantity:  6 tablet   Refills:  0         Stop taking these medicines if you haven't already. Please contact your care team if you have questions.     triamcinolone 0.1 % paste   Commonly known as:  KENALOG   Stopped by:  Wesley Sutton MD                Where to get your medicines      These medications were sent to [facility]/pharmacy #[id number] - [place], [place] - [address] [facility]  [address] [place] [address]     Phone:  [phone]     ciprofloxacin 500 MG tablet                Primary Care Provider Office Phone # Fax #    Wesley Sutton [phone]       [address]  [place] [address]        Equal Access to Services     AGUSTÍN MIRANDA AH: Hadii kristopher Daugherty, wacourtda luqadaha, qaybta kaalmada nevaehyada, trini banks. So [facility] [phone].    ATENCIÓN: Si habla español, tiene a parker disposición servicios gratuitos de asistencia lingüística. Llame al [phone].    We comply with applicable  federal civil rights laws and [place] laws. We do not discriminate on the basis of race, color, national origin, age, disability sex, sexual orientation or gender identity.            Thank you!     Thank you for choosing [facility]  for your care. Our goal is always to provide you with excellent care. Hearing back from our patients is one way we can continue to improve our services. Please take a few minutes to complete the written survey that you may receive in the mail after your visit with us. Thank you!             Your Updated Medication List - Protect others around you: Learn how to safely use, store and throw away your medicines at www.disposemymeds.org.          This list is accurate as of: 7/6/17  3:41 PM.  Always use your most recent med list.                   Brand Name Dispense Instructions for use Diagnosis    cholecalciferol 1000 UNIT tablet    vitamin D    100 tablet    Take 6 tablets (6,000 Units) by mouth daily For 6 weeks then 2 tab daily after that    Vitamin D deficiency       ciprofloxacin 500 MG tablet    CIPRO    6 tablet    Take 1 tablet (500 mg) by mouth 2 times daily    Traveler's diarrhea       ferrous gluconate 324 (38 FE) MG tablet    FERGON    100 tablet    Take 1 tablet (324 mg) by mouth daily (with breakfast)    Iron deficiency anemia, unspecified iron deficiency anemia type

## 2017-07-06 NOTE — PROGRESS NOTES
SUBJECTIVE:                                                   Jenny Alcazar is a 36 year old female who presents to clinic today for the following health issue(s):  Patient presents with:  Vaginal Problem      HPI:  Pt here today with concerns about engaging in intercourse. [place] is [place] and she is nervous about [place]. She was  once before but was never sexually active. She is circumcised.     Patient's last menstrual period was 07/01/2017 (approximate)..   Patient is not sexually active, No obstetric history on file..  Using none for contraception.    reports that she has never smoked. She has never used smokeless tobacco.    STD testing offered?  Declined    Health maintenance updated:  yes    Today's PHQ-2 Score:   PHQ-2 ( 1999 Pfizer) 4/6/2017   Q1: Little interest or pleasure in doing things 0   Q2: Feeling down, depressed or hopeless 0   PHQ-2 Score 0     Today's PHQ-9 Score: No flowsheet data found.  Today's CHRIS-7 Score: No flowsheet data found.    Problem list and histories reviewed & adjusted, as indicated.  Additional history: as documented.    Patient Active Problem List   Diagnosis     CARDIOVASCULAR SCREENING; LDL GOAL LESS THAN 160     Iron deficiency anemia     Vitamin D deficiency     Past Surgical History:   Procedure Laterality Date     HEMORRHOID SURGERY        Social History   Substance Use Topics     Smoking status: Never Smoker     Smokeless tobacco: Never Used     Alcohol use No      Problem (# of Occurrences) Relation (Name,Age of Onset)    Hypertension (1) Father            Current Outpatient Prescriptions   Medication Sig     ciprofloxacin (CIPRO) 500 MG tablet Take 1 tablet (500 mg) by mouth 2 times daily     cholecalciferol (VITAMIN D) 1000 UNIT tablet Take 6 tablets (6,000 Units) by mouth daily For 6 weeks then 2 tab daily after that     ferrous gluconate (FERGON) 324 (38 FE) MG tablet Take 1 tablet (324 mg) by mouth daily (with breakfast)      No current facility-administered medications for this visit.      No Known Allergies    ROS:  12 point review of systems negative other than symptoms noted below.    OBJECTIVE:     BP 98/56  Wt 138 lb (62.6 kg)  LMP 07/01/2017 (Approximate)  Breastfeeding? No  BMI 20.98 kg/m2  Body mass index is 20.98 kg/(m^2).    Exam:  Constitutional:  Appearance: Well nourished, well developed alert, in no acute distress  Neurologic/Psychiatric:  Mental Status:  Oriented X3   Pelvic Exam:  External Genitalia:     Normal appearance for age, no discharge present, no tenderness present, no inflammatory lesions present, color normal. Circumcised. alfonso size opening.   Genitalia and Groin:     No rashes present, no lesions present, no areas of discoloration, no masses present     In-Clinic Test Results:  Results for orders placed or performed in visit on 07/06/17 (from the past 24 hour(s))   CBC with platelets   Result Value Ref Range    WBC 3.7 (L) 4.0 - 11.0 10e9/L    RBC Count 4.52 3.8 - 5.2 10e12/L    Hemoglobin 11.9 11.7 - 15.7 g/dL    Hematocrit 37.3 35.0 - 47.0 %    MCV 83 78 - 100 fl    MCH 26.3 (L) 26.5 - 33.0 pg    MCHC 31.9 31.5 - 36.5 g/dL    RDW 16.1 (H) 10.0 - 15.0 %    Platelet Count 219 150 - 450 10e9/L       ASSESSMENT/PLAN:                                                        ICD-10-CM    1. Physically well but worried Z71.1    2. 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 She did not tolerate either speculum or bimanual exam. Discussed she anatomically can be sexually active. This seems more of a mental component.     MEÑO Caldera [facility]

## 2017-07-06 NOTE — MR AVS SNAPSHOT
After Visit Summary   7/6/2017    Jenny Alcazar    MRN: [id number]           Patient Information     Date Of Birth          1980        Visit Information        Provider Department      7/6/2017 11:00 AM Wesley Sutton MD [facility] [place]        Today's Diagnoses     Vitamin D deficiency    -  1    Iron deficiency anemia, unspecified iron deficiency anemia type        Traveler's diarrhea        Dyspareunia, female           Follow-ups after your visit        Additional Services     OB/GYN REFERRAL       Your provider has referred you to:  G: [facility] [phone]  [email or web address]    Please be aware that coverage of these services is subject to the terms and limitations of your health insurance plan.  Call member services at your health plan with any benefit or coverage questions.      Please bring the following with you to your appointment:    (1) Any X-Rays, CTs or MRIs which have been performed.  Contact the facility where they were done to arrange for  prior to your scheduled appointment.   (2) List of current medications   (3) This referral request   (4) Any documents/labs given to you for this referral                  Who to contact     If you have questions or need follow up information about today's clinic visit or your schedule please contact [facility] [place] directly at [phone].  Normal or non-critical lab and imaging results will be communicated to you by MyChart, letter or phone within 4 business days after the clinic has received the results. If you do not hear from us within 7 days, please contact the clinic through MyChart or phone. If you have a critical or abnormal lab result, we will notify you by phone as soon as possible.  Submit refill requests through DiscountDoc or call your pharmacy and they will forward the refill request to us. Please allow 3 business days for your refill to  "be completed.          Additional Information About Your Visit        InvestingNoteharTsavo Media Information     EVRGR lets you send messages to your doctor, view your test results, renew your prescriptions, schedule appointments and more. To sign up, go to [email or web address] . Click on \"Log in\" on the left side of the screen, which will take you to the Welcome page. Then click on \"Sign up Now\" on the right side of the page.     You will be asked to enter the access code listed below, as well as some personal information. Please follow the directions to create your username and password.     Your access code is: [email or web address]  Expires: 10/4/2017 11:37 AM     Your access code will  in 90 days. If you need help or a new code, please call your [place] clinic or [phone].        Care EveryWhere ID     This is your Care EveryWhere ID. This could be used by other organizations to access your [place] medical records  [id number]        Your Vitals Were     Pulse Temperature Last Period Pulse Oximetry BMI (Body Mass Index)       56 98.3  F (36.8  C) (Tympanic) 2017 (Approximate) 98% 20.98 kg/m2        Blood Pressure from Last 3 Encounters:   17 102/60   17 96/60   17 104/60    Weight from Last 3 Encounters:   17 138 lb (62.6 kg)   17 140 lb (63.5 kg)   17 139 lb (63 kg)              We Performed the Following     CBC with platelets     Iron and iron binding capacity     OB/GYN REFERRAL     Vitamin D Deficiency          Today's Medication Changes          These changes are accurate as of: 17 11:37 AM.  If you have any questions, ask your nurse or doctor.               Start taking these medicines.        Dose/Directions    ciprofloxacin 500 MG tablet   Commonly known as:  CIPRO   Used for:  Traveler's diarrhea   Started by:  Wesley Sutton MD        Dose:  500 mg   Take 1 tablet (500 mg) by mouth 2 times daily   Quantity:  6 tablet   Refills:  0         Stop taking these medicines " if you haven't already. Please contact your care team if you have questions.     triamcinolone 0.1 % paste   Commonly known as:  KENALOG   Stopped by:  Wesley Sutton MD                Where to get your medicines      These medications were sent to [facility]/pharmacy #[id number] - [place], [place] - [address]  [address], [place] [address]     Phone:  [phone]     ciprofloxacin 500 MG tablet                Primary Care Provider Office Phone # Fax #    Wesley Sutton [phone]       [facility] [address]  [place] [address]        Equal Access to Services     AGUSTÍN MIRANDA : Hadii kristopher ku hadasho Soomaali, waaxda luqadaha, qaybta kaalmada adeegyada, trini escuderoin haysavannahn nevaeh michaels . So [facility] [phone].    ATENCIÓN: Si habla español, tiene a parker disposición servicios gratuitos de asistencia lingüística. Llame al [phone].    We comply with applicable federal civil rights laws and [place] laws. We do not discriminate on the basis of race, color, national origin, age, disability sex, sexual orientation or gender identity.            Thank you!     Thank you for choosing [facility]  for your care. Our goal is always to provide you with excellent care. Hearing back from our patients is one way we can continue to improve our services. Please take a few minutes to complete the written survey that you may receive in the mail after your visit with us. Thank you!             Your Updated Medication List - Protect others around you: Learn how to safely use, store and throw away your medicines at www.disposemymeds.org.          This list is accurate as of: 7/6/17 11:37 AM.  Always use your most recent med list.                   Brand Name Dispense Instructions for use Diagnosis    cholecalciferol 1000 UNIT tablet    vitamin D    100 tablet    Take 6 tablets (6,000 Units) by mouth daily For 6 weeks then 2 tab daily after that     Vitamin D deficiency       ciprofloxacin 500 MG tablet    CIPRO    6 tablet    Take 1 tablet (500 mg) by mouth 2 times daily    Traveler's diarrhea       ferrous gluconate 324 (38 FE) MG tablet    FERGON    100 tablet    Take 1 tablet (324 mg) by mouth daily (with breakfast)    Iron deficiency anemia, unspecified iron deficiency anemia type

## 2017-07-06 NOTE — LETTER
[place] [address] [place] [address]   [phone]      July 7, 2017      Jenny Alcazar  [address]  [place] [address]            Dear Jenny,      I have reviewed your labs.   All labs are improving inlcuding Vit D and hemoglobin and iron levels, continue the medication as dicussed. Enclosed is a copy of the results.  If you have any questions or concerns, please call the clinic at [phone] and make a follow up appointment with me.  Results for orders placed or performed in visit on 07/06/17   CBC with platelets   Result Value Ref Range    WBC 3.7 (L) 4.0 - 11.0 10e9/L    RBC Count 4.52 3.8 - 5.2 10e12/L    Hemoglobin 11.9 11.7 - 15.7 g/dL    Hematocrit 37.3 35.0 - 47.0 %    MCV 83 78 - 100 fl    MCH 26.3 (L) 26.5 - 33.0 pg    MCHC 31.9 31.5 - 36.5 g/dL    RDW 16.1 (H) 10.0 - 15.0 %    Platelet Count 219 150 - 450 10e9/L   Vitamin D Deficiency   Result Value Ref Range    Vitamin D Deficiency screening 41 20 - 75 ug/L   Iron and iron binding capacity   Result Value Ref Range    Iron 36 35 - 180 ug/dL    Iron Binding Cap 364 240 - 430 ug/dL    Iron Saturation Index 10 (L) 15 - 46 %         Sincerely,    Wesley Sutton M.D.

## 2017-07-06 NOTE — NURSING NOTE
"Chief Complaint   Patient presents with     Consult       Initial /60  Pulse 56  Temp 98.3  F (36.8  C) (Tympanic)  Wt 138 lb (62.6 kg)  LMP 07/01/2017 (Approximate)  SpO2 98%  BMI 20.98 kg/m2 Estimated body mass index is 20.98 kg/(m^2) as calculated from the following:    Height as of 1/3/17: 5' 8\" (1.727 m).    Weight as of this encounter: 138 lb (62.6 kg).  Medication Reconciliation: complete    Current Outpatient Prescriptions   Medication Sig Dispense Refill     cholecalciferol (VITAMIN D) 1000 UNIT tablet Take 6 tablets (6,000 Units) by mouth daily For 6 weeks then 2 tab daily after that (Patient not taking: Reported on 7/6/2017) 100 tablet 3     ferrous gluconate (FERGON) 324 (38 FE) MG tablet Take 1 tablet (324 mg) by mouth daily (with breakfast) (Patient not taking: Reported on 7/6/2017) 100 tablet 3     triamcinolone (KENALOG) 0.1 % paste Take by mouth 2 times daily (Patient not taking: Reported on 7/6/2017) 5 g 1       Medardo M, DESTINI  "

## 2017-07-06 NOTE — PROGRESS NOTES
SUBJECTIVE:                                                    Jenny Alcazar is a 36 year old female who presents to clinic today for the following health issues:      Concern - follow up Vit D and Iron levels - stopped meds about one month ago   She did not take mediation 10 days of ramdan, her iron was low and vit d, she was advised to take 6000 units for 6 weeks and than 2000 IU after that.  She is also taking iron tab for anemia.  [place] , asking for any medication for travelers diarrhea.  She has some issues with dyspareunia, would like to see gynecology for evaluation. She fett it was hard for partner for penetration.    Traveling to [facility] next week for 1-2 months     Problem list and histories reviewed & adjusted, as indicated.          Reviewed and updated as needed this visit by clinical staff  Tobacco  Allergies  Meds       Reviewed and updated as needed this visit by Provider         ROS:  C: NEGATIVE for fever, chills, change in weight  E/M: NEGATIVE for ear, mouth and throat problems  R: NEGATIVE for significant cough or SOB  CV: NEGATIVE for chest pain, palpitations or peripheral edema    OBJECTIVE:                                                    /60  Pulse 56  Temp 98.3  F (36.8  C) (Tympanic)  Wt 138 lb (62.6 kg)  LMP 07/01/2017 (Approximate)  SpO2 98%  BMI 20.98 kg/m2  Body mass index is 20.98 kg/(m^2).   GENERAL: healthy, alert, well nourished, well hydrated, no distress  NECK: no tenderness, no adenopathy, no asymmetry, no masses, no stiffness; thyroid- normal to palpation  RESP: lungs clear to auscultation - no rales, no rhonchi, no wheezes  CV: regular rates and rhythm, normal S1 S2, no S3 or S4 and no murmur, no click or rub -         ASSESSMENT/PLAN:                                                        ICD-10-CM    1. Vitamin D deficiency E55.9 CBC with platelets     Vitamin D Deficiency   2. Iron deficiency anemia, unspecified iron deficiency anemia type  D50.9 CBC with platelets     Vitamin D Deficiency     Iron and iron binding capacity   3. Traveler's diarrhea A09 ciprofloxacin (CIPRO) 500 MG tablet   4. Dyspareunia, female N94.10 OB/GYN REFERRAL       Hemoglobin has improved, advice continue the iron tab.  Vit d levels spending, she can continue 2000 IU daily.  Dyspareunia , suggested Gyne evaluation  Cipro as needed for travels diarrhea, do not take it unless needed. Advice hydration and use bottled or boiled water.    Wesley Sutton MD  [facility] [place]

## 2017-07-07 LAB — DEPRECATED CALCIDIOL+CALCIFEROL SERPL-MC: 41 UG/L (ref 20–75)

## 2017-10-11 ENCOUNTER — OFFICE VISIT (OUTPATIENT)
Dept: FAMILY MEDICINE | Facility: CLINIC | Age: 37
End: 2017-10-11
Payer: COMMERCIAL

## 2017-10-11 VITALS
DIASTOLIC BLOOD PRESSURE: 60 MMHG | HEART RATE: 66 BPM | WEIGHT: 142 LBS | BODY MASS INDEX: 21.52 KG/M2 | SYSTOLIC BLOOD PRESSURE: 98 MMHG | HEIGHT: 68 IN | TEMPERATURE: 98.4 F

## 2017-10-11 DIAGNOSIS — E55.9 VITAMIN D DEFICIENCY: ICD-10-CM

## 2017-10-11 DIAGNOSIS — M79.672 PAIN IN BOTH FEET: ICD-10-CM

## 2017-10-11 DIAGNOSIS — D64.9 ANEMIA, UNSPECIFIED TYPE: ICD-10-CM

## 2017-10-11 DIAGNOSIS — Z00.00 ROUTINE ADULT HEALTH MAINTENANCE: Primary | ICD-10-CM

## 2017-10-11 DIAGNOSIS — M79.671 PAIN IN BOTH FEET: ICD-10-CM

## 2017-10-11 LAB
BASOPHILS # BLD AUTO: 0 10E9/L (ref 0–0.2)
BASOPHILS NFR BLD AUTO: 0.4 %
DIFFERENTIAL METHOD BLD: ABNORMAL
EOSINOPHIL # BLD AUTO: 0.8 10E9/L (ref 0–0.7)
EOSINOPHIL NFR BLD AUTO: 13.9 %
ERYTHROCYTE [DISTWIDTH] IN BLOOD BY AUTOMATED COUNT: 13.3 % (ref 10–15)
HCT VFR BLD AUTO: 36.6 % (ref 35–47)
HGB BLD-MCNC: 11.5 G/DL (ref 11.7–15.7)
LYMPHOCYTES # BLD AUTO: 2.3 10E9/L (ref 0.8–5.3)
LYMPHOCYTES NFR BLD AUTO: 40.4 %
MCH RBC QN AUTO: 26.6 PG (ref 26.5–33)
MCHC RBC AUTO-ENTMCNC: 31.4 G/DL (ref 31.5–36.5)
MCV RBC AUTO: 85 FL (ref 78–100)
MONOCYTES # BLD AUTO: 0.7 10E9/L (ref 0–1.3)
MONOCYTES NFR BLD AUTO: 12.8 %
NEUTROPHILS # BLD AUTO: 1.9 10E9/L (ref 1.6–8.3)
NEUTROPHILS NFR BLD AUTO: 32.5 %
PLATELET # BLD AUTO: 256 10E9/L (ref 150–450)
RBC # BLD AUTO: 4.32 10E12/L (ref 3.8–5.2)
WBC # BLD AUTO: 5.7 10E9/L (ref 4–11)

## 2017-10-11 PROCEDURE — 83540 ASSAY OF IRON: CPT | Performed by: INTERNAL MEDICINE

## 2017-10-11 PROCEDURE — 36415 COLL VENOUS BLD VENIPUNCTURE: CPT | Performed by: INTERNAL MEDICINE

## 2017-10-11 PROCEDURE — 82728 ASSAY OF FERRITIN: CPT | Performed by: INTERNAL MEDICINE

## 2017-10-11 PROCEDURE — 85025 COMPLETE CBC W/AUTO DIFF WBC: CPT | Performed by: INTERNAL MEDICINE

## 2017-10-11 PROCEDURE — 82306 VITAMIN D 25 HYDROXY: CPT | Performed by: INTERNAL MEDICINE

## 2017-10-11 PROCEDURE — 99395 PREV VISIT EST AGE 18-39: CPT | Mod: 25 | Performed by: INTERNAL MEDICINE

## 2017-10-11 PROCEDURE — 83550 IRON BINDING TEST: CPT | Performed by: INTERNAL MEDICINE

## 2017-10-11 NOTE — LETTER
October 12, 2017      Jenny Ottoniel  [address]  [place] [address]        Dear ,    We are writing to inform you of your test results.    You have a mild anemia and your iron is low. I would restart your iron supplements.  Your vitamin D is in the normal range.  You could probably just take your supplement during the winter months.        Resulted Orders   CBC with platelets and differential   Result Value Ref Range    WBC 5.7 4.0 - 11.0 10e9/L    RBC Count 4.32 3.8 - 5.2 10e12/L    Hemoglobin 11.5 (L) 11.7 - 15.7 g/dL      Comment:      Results confirmed by repeat test    Hematocrit 36.6 35.0 - 47.0 %    MCV 85 78 - 100 fl    MCH 26.6 26.5 - 33.0 pg    MCHC 31.4 (L) 31.5 - 36.5 g/dL    RDW 13.3 10.0 - 15.0 %    Platelet Count 256 150 - 450 10e9/L    Diff Method Automated Method     % Neutrophils 32.5 %    % Lymphocytes 40.4 %    % Monocytes 12.8 %    % Eosinophils 13.9 %    % Basophils 0.4 %    Absolute Neutrophil 1.9 1.6 - 8.3 10e9/L    Absolute Lymphocytes 2.3 0.8 - 5.3 10e9/L    Absolute Monocytes 0.7 0.0 - 1.3 10e9/L    Absolute Eosinophils 0.8 (H) 0.0 - 0.7 10e9/L    Absolute Basophils 0.0 0.0 - 0.2 10e9/L   Iron and iron binding capacity   Result Value Ref Range    Iron 24 (L) 35 - 180 ug/dL    Iron Binding Cap 399 240 - 430 ug/dL    Iron Saturation Index 6 (L) 15 - 46 %   Ferritin   Result Value Ref Range    Ferritin 6 (L) 12 - 150 ng/mL   Vitamin D Deficiency   Result Value Ref Range    Vitamin D Deficiency screening 34 20 - 75 ug/L      Comment:      Season, race, dietary intake, and treatment affect the concentration of   25-hydroxy-Vitamin D. Values may decrease during winter months and increase   during summer months. Values 20-29 ug/L may indicate Vitamin D insufficiency   and values <20 ug/L may indicate Vitamin D deficiency.  Vitamin D determination is routinely performed by an immunoassay specific for   25 hydroxyvitamin D3.  If an individual is on vitamin D2  (ergocalciferol)   supplementation, please specify 25 OH vitamin D2 and D3 level determination by   LCMSMS test [id number].         If you have any questions or concerns, please call the clinic at the number listed above.       Sincerely,        Donna Melo MD

## 2017-10-11 NOTE — PROGRESS NOTES
SUBJECTIVE:   CC: Jenny Alcazar is an 36 year old woman who presents for preventive health visit.     Jenny lives by herself. She was  recently, her  lives in [place]. 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 She is also going to school for criminal justice.    Of note she estimates her really age is probably about 29 or 30.    Healthy Habits:    Do you get at least three servings of calcium containing foods daily (dairy, green leafy vegetables, etc.)? No     Amount of exercise or daily activities, outside of work: 0 day(s) per week - active at work     Problems taking medications regularly No    Medication side effects: No    Have you had an eye exam in the past two years? yes    Do you see a dentist twice per year? no    Do you have sleep apnea, excessive snoring or daytime drowsiness?no    Foot pain - at the end of her workday after wearing her steel toed boots, she has white patch on both of her big toes at the end. It feels sort of numb. Gradually gets better over a few hours. She feels like her work boots fit well. She's also noted some pain at the top of her right foot, especially the first few steps in the morning. She fell about a year ago, and also has some residual pain from that on the head of left metatarsal.    Concerned about painful intercourse - has never been sexually active. She went to visit her  this summer, but was not able to have intercourse. She has a history of female circumcision. She saw OB/GYN a few months ago, could not tolerate any speculum exam external genitalia and vaginal opening appeared normal.    With like labs checked for iron and vitamin D. She was previously taking the supplements but stopped a few months ago so would like to see where her levels are.    Today's PHQ-2 Score:   PHQ-2 ( 1999 Pfizer) 4/6/2017 1/13/2016   Q1: Little interest or pleasure in doing things 0 0   Q2: Feeling down, depressed or hopeless 0 0   PHQ-2 Score 0 0       Abuse:  Current or Past(Physical, Sexual or Emotional)- No  Do you feel safe in your environment - YES    Social History   Substance Use Topics     Smoking status: Never Smoker     Smokeless tobacco: Never Used     Alcohol use No     The patient does not drink >3 drinks per day nor >7 drinks per week.    Reviewed orders with patient.  Reviewed health maintenance and updated orders accordingly - Yes  Patient Active Problem List   Diagnosis     Iron deficiency anemia     Vitamin D deficiency     Estimates she was born 1986.     Female circumcision     Past Surgical History:   Procedure Laterality Date     GYN SURGERY      female circumcision      HEMORRHOID SURGERY         Social History   Substance Use Topics     Smoking status: Never Smoker     Smokeless tobacco: Never Used     Alcohol use No     Family History   Problem Relation Age of Onset     Hypertension Father          No current outpatient prescriptions on file.         Mammogram not appropriate for this patient based on age.    Pertinent mammograms are reviewed under the imaging tab.  History of abnormal Pap smear: NO - age 30- 65 PAP every 3 years recommended    Reviewed and updated as needed this visit by clinical staff  Tobacco  Allergies  Meds  Med Hx  Surg Hx  Fam Hx  Soc Hx        Reviewed and updated as needed this visit by Provider  Tobacco  Allergies  Meds  Med Hx  Surg Hx  Fam Hx  Soc Hx             ROS:  C: NEGATIVE for fever, chills, change in weight  I: NEGATIVE for worrisome rashes, moles or lesions  E: NEGATIVE for vision changes or irritation  ENT: NEGATIVE for ear, mouth and throat problems  R: NEGATIVE for significant cough or SOB  B: NEGATIVE for masses, tenderness or discharge  CV: NEGATIVE for chest pain, palpitations or peripheral edema  GI: NEGATIVE for nausea, abdominal pain, heartburn, or change in bowel habits  : NEGATIVE for unusual urinary or vaginal symptoms. Periods are regular.  M: + foot pain, NEGATIVE for significant  "arthralgias or myalgia  N: NEGATIVE for weakness, dizziness or paresthesias  P: NEGATIVE for changes in mood or affect    OBJECTIVE:   BP 98/60 (BP Location: Left arm, Patient Position: Chair, Cuff Size: Adult Regular)  Pulse 66  Temp 98.4  F (36.9  C) (Tympanic)  Ht 5' 8\" (1.727 m)  Wt 142 lb (64.4 kg)  LMP 09/16/2017  BMI 21.59 kg/m2  EXAM:  GENERAL: healthy, alert and no distress  EYES: Eyes grossly normal to inspection, PERRL and conjunctivae and sclerae normal  HENT: ear canals and TM's normal, mouth without ulcers or lesions  NECK: no adenopathy, no asymmetry, masses, or scars and thyroid normal to palpation  RESP: lungs clear to auscultation - no rales, rhonchi or wheezes  CV: regular rate and rhythm, normal S1 S2, no S3 or S4, no murmur, click or rub, no peripheral edema and peripheral pulses strong  ABDOMEN: soft, nontender, no hepatosplenomegaly, no masses and bowel sounds normal   (female): deferred  SKIN: no suspicious lesions or rashes  NEURO: Normal strength and tone, mentation intact and speech normal  PSYCH: mentation appears normal, affect normal/bright  R foot: ~1cm area of pallor on distal great toe, sensation decreased to LT. No edema. 2+ DP pulse. Mild TTP over distal 1st metatarsal. Negative squeeze test.   L foot:  ~1cm area of pallor on distal great toe, sensation decreased to LT. No edema. 2+ DP pulse. No tenderness.       ASSESSMENT/PLAN:   1. Routine adult health maintenance  Healthy woman, likely early 30s rather than late 30s.   Declined pap smear as she cannot tolerate speculum exam. Can follow up with OB-GYN for concerns about painful intercourse.   Lipids checked 2015, normal.   Declined flu shot  Given phone number for [facility] dental clinic     2. Pain in both feet  Recommended going to shoe store and making sure her work boots fit appropriately. If not improving she can see podiatry.   - PODIATRY/FOOT & ANKLE SURGERY REFERRAL    3. Anemia, unspecified type  Check labs   - CBC " "with platelets and differential  - Iron and iron binding capacity  - Ferritin    4. Vitamin D deficiency  Check labs   - Vitamin D Deficiency    COUNSELING:   Reviewed preventive health counseling, as reflected in patient instructions       Regular exercise       Healthy diet/nutrition       Osteoporosis Prevention/Bone Health         reports that she has never smoked. She has never used smokeless tobacco.    Estimated body mass index is 21.59 kg/(m^2) as calculated from the following:    Height as of this encounter: 5' 8\" (1.727 m).    Weight as of this encounter: 142 lb (64.4 kg).         Counseling Resources:  ATP IV Guidelines  Pooled Cohorts Equation Calculator  Breast Cancer Risk Calculator  FRAX Risk Assessment  ICSI Preventive Guidelines  Dietary Guidelines for Americans, 2010  USDA's MyPlate  ASA Prophylaxis  Lung CA Screening    Donna Melo MD  [facility] [place]  "

## 2017-10-11 NOTE — MR AVS SNAPSHOT
After Visit Summary   10/11/2017    Jenny Alcazar    MRN: [id number]           Patient Information     Date Of Birth          1980        Visit Information        Provider Department      10/11/2017 6:20 PM Donna Melo MD [facility] [place]        Today's Diagnoses     Pain in both feet    -  1    Screening for malignant neoplasm of cervix        Need for prophylactic vaccination and inoculation against influenza        Anemia, unspecified type        Vitamin D deficiency          Care Instructions    [facility]      Preventive Health Recommendations  Female Ages 26 - 39  Yearly exam:   See your health care provider every year in order to    Review health changes.     Discuss preventive care.      Review your medicines if you your doctor has prescribed any.    Until age 30: Get a Pap test every three years (more often if you have had an abnormal result).    After age 30: Talk to your doctor about whether you should have a Pap test every 3 years or have a Pap test with HPV screening every 5 years.   You do not need a Pap test if your uterus was removed (hysterectomy) and you have not had cancer.  You should be tested each year for STDs (sexually transmitted diseases), if you're at risk.   Talk to your provider about how often to have your cholesterol checked.  If you are at risk for diabetes, you should have a diabetes test (fasting glucose).  Shots: Get a flu shot each year. Get a tetanus shot every 10 years.   Nutrition:     Eat at least 5 servings of fruits and vegetables each day.    Eat whole-grain bread, whole-wheat pasta and brown rice instead of white grains and rice.    Talk to your provider about Calcium and Vitamin D.     Lifestyle    Exercise at least 150 minutes a week (30 minutes a day, 5 days of the week). This will help you control your weight and prevent disease.    Limit alcohol to one drink per day.    No smoking.     Wear sunscreen  "to prevent skin cancer.    See your dentist every six months for an exam and cleaning.            Follow-ups after your visit        Additional Services     PODIATRY/FOOT & ANKLE SURGERY REFERRAL       Your provider has referred you to: [facility]: [facility] [phone]   [email or web address]    Please be aware that coverage of these services is subject to the terms and limitations of your health insurance plan.  Call member services at your health plan with any benefit or coverage questions.      Please bring the following to your appointment:  >>   Any x-rays, CTs or MRIs which have been performed.  Contact the facility where they were done to arrange for  prior to your scheduled appointment.    >>   List of current medications   >>   This referral request   >>   Any documents/labs given to you for this referral                  Who to contact     If you have questions or need follow up information about today's clinic visit or your schedule please contact [facility] [place] directly at [phone].  Normal or non-critical lab and imaging results will be communicated to you by MyChart, letter or phone within 4 business days after the clinic has received the results. If you do not hear from us within 7 days, please contact the clinic through MyChart or phone. If you have a critical or abnormal lab result, we will notify you by phone as soon as possible.  Submit refill requests through MideoMe or call your pharmacy and they will forward the refill request to us. Please allow 3 business days for your refill to be completed.          Additional Information About Your Visit        MideoMe Information     [employer]Me lets you send messages to your doctor, view your test results, renew your prescriptions, schedule appointments and more. To sign up, go to [email or web address] . Click on \"Log in\" on the left side of the screen, which will take you to the Welcome page. " "Then click on \"Sign up Now\" on the right side of the page.     You will be asked to enter the access code listed below, as well as some personal information. Please follow the directions to create your username and password.     Your access code is: [email or web address]  Expires: 2018  7:00 PM     Your access code will  in 90 days. If you need help or a new code, please call your [facility] clinic or [phone].        Care EveryWhere ID     This is your Care EveryWhere ID. This could be used by other organizations to access your [facility] medical records  [id number]        Your Vitals Were     Pulse Temperature Height Last Period BMI (Body Mass Index)       66 98.4  F (36.9  C) (Tympanic) 5' 8\" (1.727 m) 2017 21.59 kg/m2        Blood Pressure from Last 3 Encounters:   10/11/17 98/60   17 98/56   17 102/60    Weight from Last 3 Encounters:   10/11/17 142 lb (64.4 kg)   17 138 lb (62.6 kg)   17 138 lb (62.6 kg)              We Performed the Following     CBC with platelets and differential     Ferritin     Iron and iron binding capacity     PODIATRY/FOOT & ANKLE SURGERY REFERRAL     Vitamin D Deficiency          Today's Medication Changes          These changes are accurate as of: 10/11/17  7:00 PM.  If you have any questions, ask your nurse or doctor.               Stop taking these medicines if you haven't already. Please contact your care team if you have questions.     cholecalciferol 1000 UNIT tablet   Commonly known as:  vitamin D   Stopped by:  Donna Melo MD           ferrous gluconate 324 (38 FE) MG tablet   Commonly known as:  FERGON   Stopped by:  Donna Melo MD                    Primary Care Provider Office Phone # Fax #    Wesley Sutton [phone]       [address]  [place] [address]        Equal Access to Services     [facility] AH: Hadii kristopher moseley hadasho Soomaali, waaxda luqadaha, qaybta kaalmada adeegyada, trini bauer " daiana cruzcristelalejandra howeRoshnirosa jocelyn. So [facility] [phone].    ATENCIÓN: Si habla español, tiene a parker disposición servicios gratuitos de asistencia lingüística. Adri al [phone].    We comply with applicable federal civil rights laws and [place] laws. We do not discriminate on the basis of race, color, national origin, age, disability, sex, sexual orientation, or gender identity.            Thank you!     [facility]. Our goal is always to provide you with excellent care. Hearing back from our patients is one way we can continue to improve our services. Please take a few minutes to complete the written survey that you may receive in the mail after your visit with us. Thank you!             Your Updated Medication List - Protect others around you: Learn how to safely use, store and throw away your medicines at www.disposemymeds.org.      Notice  As of 10/11/2017  7:00 PM    You have not been prescribed any medications.

## 2017-10-11 NOTE — PATIENT INSTRUCTIONS
[facility]      Preventive Health Recommendations  Female Ages 26 - 39  Yearly exam:   See your health care provider every year in order to    Review health changes.     Discuss preventive care.      Review your medicines if you your doctor has prescribed any.    Until age 30: Get a Pap test every three years (more often if you have had an abnormal result).    After age 30: Talk to your doctor about whether you should have a Pap test every 3 years or have a Pap test with HPV screening every 5 years.   You do not need a Pap test if your uterus was removed (hysterectomy) and you have not had cancer.  You should be tested each year for STDs (sexually transmitted diseases), if you're at risk.   Talk to your provider about how often to have your cholesterol checked.  If you are at risk for diabetes, you should have a diabetes test (fasting glucose).  Shots: Get a flu shot each year. Get a tetanus shot every 10 years.   Nutrition:     Eat at least 5 servings of fruits and vegetables each day.    Eat whole-grain bread, whole-wheat pasta and brown rice instead of white grains and rice.    Talk to your provider about Calcium and Vitamin D.     Lifestyle    Exercise at least 150 minutes a week (30 minutes a day, 5 days of the week). This will help you control your weight and prevent disease.    Limit alcohol to one drink per day.    No smoking.     Wear sunscreen to prevent skin cancer.    See your dentist every six months for an exam and cleaning.

## 2017-10-11 NOTE — NURSING NOTE
"Chief Complaint   Patient presents with     Physical       Initial BP 98/60 (BP Location: Left arm, Patient Position: Chair, Cuff Size: Adult Regular)  Pulse 66  Temp 98.4  F (36.9  C) (Tympanic)  Ht 5' 8\" (1.727 m)  Wt 142 lb (64.4 kg)  LMP 09/16/2017  BMI 21.59 kg/m2 Estimated body mass index is 21.59 kg/(m^2) as calculated from the following:    Height as of this encounter: 5' 8\" (1.727 m).    Weight as of this encounter: 142 lb (64.4 kg).  Medication Reconciliation: complete  "

## 2017-10-12 PROBLEM — N90.810 FEMALE CIRCUMCISION: Status: ACTIVE | Noted: 2017-10-12

## 2017-10-12 PROBLEM — Z60.5 AGE DISCRIMINATION: Status: ACTIVE | Noted: 2017-10-12

## 2017-10-12 LAB
DEPRECATED CALCIDIOL+CALCIFEROL SERPL-MC: 34 UG/L (ref 20–75)
FERRITIN SERPL-MCNC: 6 NG/ML (ref 12–150)
IRON SATN MFR SERPL: 6 % (ref 15–46)
IRON SERPL-MCNC: 24 UG/DL (ref 35–180)
TIBC SERPL-MCNC: 399 UG/DL (ref 240–430)

## 2017-12-24 ENCOUNTER — HEALTH MAINTENANCE LETTER (OUTPATIENT)
Age: 37
End: 2017-12-24

## 2018-05-29 DIAGNOSIS — D50.9 IRON DEFICIENCY ANEMIA, UNSPECIFIED IRON DEFICIENCY ANEMIA TYPE: ICD-10-CM

## 2018-05-30 RX ORDER — FERROUS GLUCONATE 324(38)MG
TABLET ORAL
Qty: 100 TABLET | Refills: 2 | Status: SHIPPED | OUTPATIENT
Start: 2018-05-30 | End: 2019-01-10

## 2018-05-30 NOTE — TELEPHONE ENCOUNTER
Ferrous Gluconate 324mg      Last Written Prescription Date:  4/13/17  Last Fill Quantity: 100,   # refills: 3  Last Office Visit: Kameron  Future Office visit:       Routing refill request to provider for review/approval because:  Drug not on the G, P or [facility] refill protocol or controlled substance    Anum Mccloud CMA

## 2018-07-05 ENCOUNTER — OFFICE VISIT (OUTPATIENT)
Dept: FAMILY MEDICINE | Facility: CLINIC | Age: 38
End: 2018-07-05
Payer: COMMERCIAL

## 2018-07-05 VITALS
BODY MASS INDEX: 21.52 KG/M2 | HEART RATE: 69 BPM | HEIGHT: 68 IN | TEMPERATURE: 98.8 F | DIASTOLIC BLOOD PRESSURE: 59 MMHG | WEIGHT: 142 LBS | SYSTOLIC BLOOD PRESSURE: 94 MMHG | OXYGEN SATURATION: 99 %

## 2018-07-05 DIAGNOSIS — J02.9 SORE THROAT: Primary | ICD-10-CM

## 2018-07-05 DIAGNOSIS — M54.2 NECK PAIN: ICD-10-CM

## 2018-07-05 LAB
DEPRECATED S PYO AG THROAT QL EIA: NORMAL
SPECIMEN SOURCE: NORMAL

## 2018-07-05 PROCEDURE — 87880 STREP A ASSAY W/OPTIC: CPT | Performed by: FAMILY MEDICINE

## 2018-07-05 PROCEDURE — 99214 OFFICE O/P EST MOD 30 MIN: CPT | Performed by: FAMILY MEDICINE

## 2018-07-05 PROCEDURE — 87081 CULTURE SCREEN ONLY: CPT | Performed by: FAMILY MEDICINE

## 2018-07-05 RX ORDER — NAPROXEN 500 MG/1
500 TABLET ORAL 2 TIMES DAILY PRN
Qty: 30 TABLET | Refills: 0 | Status: SHIPPED | OUTPATIENT
Start: 2018-07-05 | End: 2019-01-10

## 2018-07-05 NOTE — PROGRESS NOTES
SUBJECTIVE:   Jenny Montana is a 37 year old female who presents to clinic today for the following health issues:      Acute Illness   Acute illness concerns: Throat problem , tonsils feel sore an see spots, bad smell   Onset: 2-3 weeks     Fever: no     Chills/Sweats: no     Headache (location?): no     Sinus Pressure:no    Roof of mouth pain :  YES-     Ear Pain: no    Rhinorrhea: no     Congestion: no     Sore Throat: YES sore pimple in the back of throat     Neck pain: YES back of neck , ongoing for a while      Cough: no    Wheeze: no    Decreased Appetite: no    Nausea: no    Vomiting: no    Diarrhea:  no    Dysuria/Freq.: no    Fatigue/Achiness: no    Sick/Strep Exposure: no     Therapies Tried and outcome:       PROBLEMS TO ADD ON...  Neck Pain  Duration of complaint: neck pain x 3 months  , goes across back of shoulder .   Description:   Location: as above   Radiation: none  Intensity: mild, moderate  Progression of Symptoms:  Intermittent, but concerned with ongoing sx   Accompanying Signs & Symptoms:  Burning, prickly sensation (paresthesias) in arm(s): YES  Numbness in arm(s): no  Weakness in arm(s):  no  Fever: no  Headache: YES, sometimes   Nausea and/or vomiting: no  History:   Trauma: no   Previous neck pain: no   Previous surgery or injections: no   Previous Imaging (MRI,X ray): no   Precipitating factors:   Does movement increase the pain:  no   Alleviating factors:   Therapies Tried and outcome:  Advil, sometimes does not help        Problem list and histories reviewed & adjusted, as indicated.  Additional history: as documented    Patient Active Problem List   Diagnosis     Iron deficiency anemia     Vitamin D deficiency     Estimates she was born 1986.     Female circumcision     Past Surgical History:   Procedure Laterality Date     GYN SURGERY      female circumcision      HEMORRHOID SURGERY         Social History   Substance Use Topics     Smoking status: Never Smoker     Smokeless tobacco:  "Never Used     Alcohol use No     Family History   Problem Relation Age of Onset     Hypertension Father            Reviewed and updated as needed this visit by clinical staff       Reviewed and updated as needed this visit by Provider         ROS:  Constitutional, HEENT, cardiovascular, pulmonary, GI, , musculoskeletal, neuro, skin, endocrine and psych systems are negative, except as otherwise noted.    OBJECTIVE:     BP 94/59  Pulse 69  Temp 98.8  F (37.1  C) (Tympanic)  Ht 5' 8\" (1.727 m)  Wt 142 lb (64.4 kg)  LMP 06/22/2018  SpO2 99%  BMI 21.59 kg/m2  Body mass index is 21.59 kg/(m^2).  GENERAL: healthy, alert and no distress  EYES: Eyes grossly normal to inspection, PERRL and conjunctivae and sclerae normal  HENT: normal cephalic/atraumatic, ear canals and TM's normal,  oral mucous membranes moist, Throat with mild pharyngeal erythema, has no exudate ,  no sinus  tenderness  NECK: no adenopathy, no asymmetry,  RESP: lungs clear to auscultation - no rales, rhonchi or wheezes  CV: regular rate and rhythm, normal S1 S2, no S3 or S4, no murmur,   ABDOMEN: soft, nontender, no hepatosplenomegaly, no masses and bowel sounds normal  MS: neck with slight decrease ROM and tenderness to palpation in paracervical and trapezius area bilaterally more so on rt then left , ROM aggravates pain   NEURO: Normal strength and tone, mentation intact and speech normal  PSYCH: mentation appears normal, affect normal/bright        ASSESSMENT/PLAN:     (J02.9) Sore throat  (primary encounter diagnosis)  Comment:   Plan: Strep, Rapid Screen, Beta strep group A culture          Rapid strep negative, strep culture pending. Call and treat only if positive.    She is mostly worried about white spots on and of and it sounds like minor problem with tonsillar benign calcification., so reassurance and talked about benign nature of these.  cares and symptomatic treatment discussed follow up if problem . May consider ENT evaluation if " ongoing or frequent problem       (M54.2) Neck pain  Comment:   Plan: naproxen (NAPROSYN) 500 MG tablet, [facility] PT,         HAND, AND CHIROPRACTIC REFERRAL               discussed  neck cares and symptomatic treatment including  adequate pain control, heat,  stretches etc.    she will do follow up if no improvement or problem after . Consider further evaluation and  physical therapy if needed.     Patient expressed understanding and agreement with treatment plan. All patient's questions were answered, will let me know if has more later.  Medications: Rx's: Reviewed the potential side effects/complications of medications prescribed.       Kelsea Montana MD  [facility]

## 2018-07-05 NOTE — PATIENT INSTRUCTIONS
Take medications as directed.  Referral given to PT   Cares and symptomatic cares discussed   Follow up if problem or concern

## 2018-07-05 NOTE — MR AVS SNAPSHOT
After Visit Summary   7/5/2018    Jenny Alcazar    MRN: [id number]           Patient Information     Date Of Birth          1980        Visit Information        Provider Department      7/5/2018 12:40 PM Kelsea Montana MD [facility] [place]        Today's Diagnoses     Sore throat    -  1    Neck pain          Care Instructions    Take medications as directed.  Referral given to PT   Cares and symptomatic cares discussed   Follow up if problem or concern             Follow-ups after your visit        Additional Services     [facility] PT, HAND, AND CHIROPRACTIC REFERRAL       **This order will print in the [facility] Scheduling Office**    Physical Therapy, Hand Therapy and Chiropractic Care are available through:    *[facility]  *[facility]  *[facility]    Call one number to schedule at any of the above locations: [phone].    Your provider has referred you to:     Indication/Reason for Referral: Neck Pain  Onset of Illness: ongoing x 3 months   Therapy Orders: Evaluate and Treat  Special Programs:   Special Request:     Adan Arellano      Additional Comments for the Therapist or Chiropractor:     Please be aware that coverage of these services is subject to the terms and limitations of your health insurance plan.  Call member services at your health plan with any benefit or coverage questions.      Please bring the following to your appointment:    *Your personal calendar for scheduling future appointments  *Comfortable clothing                  Who to contact     If you have questions or need follow up information about today's clinic visit or your schedule please contact [facility] [place] directly at [phone].  Normal or non-critical lab and imaging results will be communicated to you by MyChart, letter or phone within 4 business days after the clinic has received the results. If you do not hear from us within 7  "days, please contact the clinic through [employer]UB[employer] or phone. If you have a critical or abnormal lab result, we will notify you by phone as soon as possible.  Submit refill requests through [employer] or call your pharmacy and they will forward the refill request to us. Please allow 3 business days for your refill to be completed.          Additional Information About Your Visit        KUBOO Information     [employer] lets you send messages to your doctor, view your test results, renew your prescriptions, schedule appointments and more. To sign up, go to [email or web address] . Click on \"Log in\" on the left side of the screen, which will take you to the Welcome page. Then click on \"Sign up Now\" on the right side of the page.     You will be asked to enter the access code listed below, as well as some personal information. Please follow the directions to create your username and password.     Your access code is: [email or web address]  Expires: 10/1/2018  7:40 AM     Your access code will  in 90 days. If you need help or a new code, please call your [place] clinic or [phone].        Care EveryWhere ID     This is your Care EveryWhere ID. This could be used by other organizations to access your [place] medical records  [id number]        Your Vitals Were     Pulse Temperature Height Last Period Pulse Oximetry BMI (Body Mass Index)    69 98.8  F (37.1  C) (Tympanic) 5' 8\" (1.727 m) 2018 99% 21.59 kg/m2       Blood Pressure from Last 3 Encounters:   18 94/59   10/11/17 98/60   17 98/56    Weight from Last 3 Encounters:   18 142 lb (64.4 kg)   10/11/17 142 lb (64.4 kg)   17 138 lb (62.6 kg)              We Performed the Following     Beta strep group A culture     [facility] PT, HAND, AND CHIROPRACTIC REFERRAL     Strep, Rapid Screen          Today's Medication Changes          These changes are accurate as of 18  1:45 PM.  If you have any questions, ask your nurse or doctor.               Start " taking these medicines.        Dose/Directions    naproxen 500 MG tablet   Commonly known as:  NAPROSYN   Used for:  Neck pain   Started by:  Kelsea Montana MD        Dose:  500 mg   Take 1 tablet (500 mg) by mouth 2 times daily as needed for moderate pain   Quantity:  30 tablet   Refills:  0            Where to get your medicines      These medications were sent to [facility]/pharmacy #[id number] - [place], [place] - [address]  [address], [place] [address]     Phone:  [phone]     naproxen 500 MG tablet                Primary Care Provider Office Phone # Fax #    Wesley Sutton [phone]       [address]  [place] [address]        Equal Access to Services     [facility] : Mani pedroo Soashley, waaxda luqadaha, qaybta kaalmada adeegyada, trini michaels . So [facility] [phone].    ATENCIÓN: Si habla español, tiene a parker disposición servicios gratuitos de asistencia lingüística. Llame al [phone].    We comply with applicable federal civil rights laws and [place] laws. We do not discriminate on the basis of race, color, national origin, age, disability, sex, sexual orientation, or gender identity.            Thank you!     [facility]  [facility] your [facility]. Our goal is always to provide you with excellent care. Hearing back from our patients is one way we can continue to improve our services. Please take a few minutes to complete the written survey that you may receive in the mail after your visit with us. Thank you!             Your Updated Medication List - Protect others around you: Learn how to safely use, store and throw away your medicines at www.disposemymeds.org.          This list is accurate as of 7/5/18  1:45 PM.  Always use your most recent med list.                   Brand Name Dispense Instructions for use Diagnosis    ferrous gluconate 324 (38 Fe) MG tablet    FERGON    100  tablet    TAKE 1 TABLET (324 MG) BY MOUTH DAILY (WITH BREAKFAST)    Iron deficiency anemia, unspecified iron deficiency anemia type       naproxen 500 MG tablet    NAPROSYN    30 tablet    Take 1 tablet (500 mg) by mouth 2 times daily as needed for moderate pain    Neck pain

## 2018-07-06 LAB
BACTERIA SPEC CULT: NORMAL
SPECIMEN SOURCE: NORMAL

## 2018-07-16 ENCOUNTER — THERAPY VISIT (OUTPATIENT)
Dept: PHYSICAL THERAPY | Facility: CLINIC | Age: 38
End: 2018-07-16
Payer: COMMERCIAL

## 2018-07-16 DIAGNOSIS — M54.2 CERVICALGIA: Primary | ICD-10-CM

## 2018-07-16 PROCEDURE — 97161 PT EVAL LOW COMPLEX 20 MIN: CPT | Mod: GP | Performed by: PHYSICAL THERAPIST

## 2018-07-16 PROCEDURE — G8981 BODY POS CURRENT STATUS: HCPCS | Mod: GP | Performed by: PHYSICAL THERAPIST

## 2018-07-16 PROCEDURE — G8982 BODY POS GOAL STATUS: HCPCS | Mod: GP | Performed by: PHYSICAL THERAPIST

## 2018-07-16 PROCEDURE — 97110 THERAPEUTIC EXERCISES: CPT | Mod: GP | Performed by: PHYSICAL THERAPIST

## 2018-07-16 NOTE — PROGRESS NOTES
[facility] Initial Evaluation  Subjective:  Patient is a 37 year old female presenting with rehab cervical spine hpi. The history is provided by the patient.   Jenny Alcazar is a 37 year old female with a cervical spine condition.  Condition occurred with:  Insidious onset.  Condition occurred: for unknown reasons.  This is a new condition  Patient has had neck pain with radicular symptoms for 3 weeks. MD referral on 7/5/18.  .    Patient reports pain:  Cervical right side.  Radiates to:  Hand right.  Pain is described as aching and is constant and reported as 6/10.  Associated symptoms:  Loss of motion/stiffness, numbness and headache. Pain is worse in the A.M..  Symptoms are exacerbated by looking up or down Relieved by: Massage and moving the head.  Since onset symptoms are unchanged.        General health as reported by patient is good.  Pertinent medical history includes:  None.  Medical allergies: no.  Other surgeries include:  Cancer surgery.  Current medications:  None as reported by the patient.  Current occupation is Manufacturing lead  .  Patient is working in normal job without restrictions.  Primary job tasks include:  Prolonged standing.    Barriers include:  None as reported by the patient.    Red flags:  None as reported by the patient.                        Objective:  Standing Alignment:    Cervical/Thoracic:  Forward head  Shoulder/UE:  Rounded shoulders                  Flexibility/Screens:   Positive screens:  CervicalNegative screens: Shoulder                         Cervical/Thoracic Evaluation    AROM:  AROM Cervical:    Flexion:            WNL  Extension:       WNL  Rotation:         Left: WNL     Right: 90%  Side Bend:      Left: 90%     Right:  WNL      Headaches: cervical  Cervical Myotomes:  normal                      Cervical Dermatomes:  normal                    Cervical Palpation:    Tenderness present at Left:    Upper Trap  Tenderness not present at Left:    Levator; Erector Spinae or Suboccipitals  Tenderness present at Right:    Upper Trap; Levator and Erector Spinae  Tenderness not present at Right:      Suboccipitals                                                  Jerilyn Cervical Evaluation        Test Movements:    PRO: During: no effect  After: no effect  Mechanical Response: no effect  Repeat PRO: During: decreases  After: better  Mechanical Response: no effect  RET: During: no effect  After: no effect  Mechanical Response: no effect  Repeat RET: During: decreases  After: better  Mechanical Response: no effect                                                                     ROS    Assessment/Plan:    Patient is a 37 year old female with cervical complaints.    Patient has the following significant findings with corresponding treatment plan.                Diagnosis 1:  Cervicalgia  Pain -  hot/cold therapy, US, electric stimulation, mechanical traction, manual therapy, splint/taping/bracing/orthotics, self management, education, directional preference exercise and home program  Decreased ROM/flexibility - manual therapy, therapeutic exercise, therapeutic activity and home program  Impaired muscle performance - electric stimulation, neuro re-education and home program  Decreased function - therapeutic activities and home program  Impaired posture - neuro re-education, therapeutic activities and home program    Therapy Evaluation Codes:   1) History comprised of:   Personal factors that impact the plan of care:      None.    Comorbidity factors that impact the plan of care are:      None.     Medications impacting care: None.  2) Examination of Body Systems comprised of:   Body structures and functions that impact the plan of care:      Cervical spine.   Activity limitations that impact the plan of care are:      Bending, Reading/Computer work, Sitting and Working.  3) Clinical presentation characteristics  are:   Stable/Uncomplicated.  4) Decision-Making    Low complexity using standardized patient assessment instrument and/or measureable assessment of functional outcome.  Cumulative Therapy Evaluation is: Low complexity.    Previous and current functional limitations:  (See Goal Flow Sheet for this information)    Short term and Long term goals: (See Goal Flow Sheet for this information)     Communication ability:  Patient appears to be able to clearly communicate and understand verbal and written communication and follow directions correctly.  Treatment Explanation - The following has been discussed with the patient:   RX ordered/plan of care  Anticipated outcomes  Possible risks and side effects  This patient would benefit from PT intervention to resume normal activities.   Rehab potential is good.    Frequency:  1 X week, once daily  Duration:  for 6 weeks  Discharge Plan:  Achieve all LTG.  Independent in home treatment program.  Reach maximal therapeutic benefit.    Please refer to the daily flowsheet for treatment today, total treatment time and time spent performing 1:1 timed codes.

## 2018-07-16 NOTE — MR AVS SNAPSHOT
"              After Visit Summary   2018    Jenny Alcazar    MRN: [id number]           Patient Information     Date Of Birth          1980        Visit Information        Provider Department      2018 5:30 PM John Sal PT [facility] Physical Therapy        Today's Diagnoses     Cervicalgia    -  1       Follow-ups after your visit        Who to contact     If you have questions or need follow up information about today's clinic visit or your schedule please contact [facility] directly at [phone].  Normal or non-critical lab and imaging results will be communicated to you by ZMPhart, letter or phone within 4 business days after the clinic has received the results. If you do not hear from us within 7 days, please contact the clinic through [employer] or phone. If you have a critical or abnormal lab result, we will notify you by phone as soon as possible.  Submit refill requests through [employer] or call your pharmacy and they will forward the refill request to us. Please allow 3 business days for your refill to be completed.          Additional Information About Your Visit        MyChart Information     [employer] lets you send messages to your doctor, view your test results, renew your prescriptions, schedule appointments and more. To sign up, go to [email or web address] . Click on \"Log in\" on the left side of the screen, which will take you to the Welcome page. Then click on \"Sign up Now\" on the right side of the page.     You will be asked to enter the access code listed below, as well as some personal information. Please follow the directions to create your username and password.     Your access code is: [email or web address]  Expires: 10/1/2018  7:40 AM     Your access code will  in 90 days. If you need help or a new code, please call your [place] clinic or [phone].        Care EveryWhere ID     This " is your Care EveryWhere ID. This could be used by other organizations to access your [facility] medical records  [id number]        Your Vitals Were     Last Period                   06/22/2018            Blood Pressure from Last 3 Encounters:   07/05/18 94/59   10/11/17 98/60   07/06/17 98/56    Weight from Last 3 Encounters:   07/05/18 64.4 kg (142 lb)   10/11/17 64.4 kg (142 lb)   07/06/17 62.6 kg (138 lb)              We Performed the Following     HC PT EVAL, LOW COMPLEXITY     [facility] CERT REPORT     [facility] INITIAL EVAL REPORT     THERAPEUTIC EXERCISES        Primary Care Provider Office Phone # Fax #    Wesley Sutton [phone]       [address]  [place] [address]        Equal Access to Services     [facility]: Hadii aad ku hadasho Soomaali, waaxda luqadaha, qaybta kaalmada adeegyada, waxlevi michaels . So [facility] [phone].    ATENCIÓN: Si habla español, tiene a parker disposición servicios gratuitos de asistencia lingüística. Ll[facility] [phone].    We comply with applicable federal civil rights laws and [place] laws. We do not discriminate on the basis of race, color, national origin, age, disability, sex, sexual orientation, or gender identity.            Thank you!     Thank you for choosing [facility] [place] [facility]  for your care. Our goal is always to provide you with excellent care. Hearing back from our patients is one way we can continue to improve our services. Please take a few minutes to complete the written survey that you may receive in the mail after your visit with us. Thank you!             Your Updated Medication List - Protect others around you: Learn how to safely use, store and throw away your medicines at www.disposemymeds.org.          This list is accurate as of 7/16/18 11:59 PM.  Always use your most recent med list.                   Brand Name Dispense Instructions for use Diagnosis    ferrous  gluconate 324 (38 Fe) MG tablet    FERGON    100 tablet    TAKE 1 TABLET (324 MG) BY MOUTH DAILY (WITH BREAKFAST)    Iron deficiency anemia, unspecified iron deficiency anemia type       naproxen 500 MG tablet    NAPROSYN    30 tablet    Take 1 tablet (500 mg) by mouth 2 times daily as needed for moderate pain    Neck pain

## 2018-07-18 PROBLEM — M54.2 CERVICALGIA: Status: ACTIVE | Noted: 2018-07-18

## 2019-01-08 ENCOUNTER — OFFICE VISIT (OUTPATIENT)
Dept: FAMILY MEDICINE | Facility: CLINIC | Age: 39
End: 2019-01-08
Payer: COMMERCIAL

## 2019-01-08 VITALS
SYSTOLIC BLOOD PRESSURE: 94 MMHG | HEART RATE: 64 BPM | WEIGHT: 146 LBS | TEMPERATURE: 97.4 F | DIASTOLIC BLOOD PRESSURE: 60 MMHG | BODY MASS INDEX: 22.2 KG/M2

## 2019-01-08 DIAGNOSIS — R53.83 OTHER FATIGUE: ICD-10-CM

## 2019-01-08 DIAGNOSIS — M25.50 MULTIPLE JOINT PAIN: Primary | ICD-10-CM

## 2019-01-08 LAB
ERYTHROCYTE [DISTWIDTH] IN BLOOD BY AUTOMATED COUNT: 16 % (ref 10–15)
ERYTHROCYTE [SEDIMENTATION RATE] IN BLOOD BY WESTERGREN METHOD: 13 MM/H (ref 0–20)
HCT VFR BLD AUTO: 36.4 % (ref 35–47)
HGB BLD-MCNC: 11.1 G/DL (ref 11.7–15.7)
MCH RBC QN AUTO: 23.6 PG (ref 26.5–33)
MCHC RBC AUTO-ENTMCNC: 30.5 G/DL (ref 31.5–36.5)
MCV RBC AUTO: 77 FL (ref 78–100)
PLATELET # BLD AUTO: 286 10E9/L (ref 150–450)
RBC # BLD AUTO: 4.7 10E12/L (ref 3.8–5.2)
WBC # BLD AUTO: 4.6 10E9/L (ref 4–11)

## 2019-01-08 PROCEDURE — 99214 OFFICE O/P EST MOD 30 MIN: CPT | Performed by: FAMILY MEDICINE

## 2019-01-08 PROCEDURE — 36415 COLL VENOUS BLD VENIPUNCTURE: CPT | Performed by: FAMILY MEDICINE

## 2019-01-08 PROCEDURE — 86431 RHEUMATOID FACTOR QUANT: CPT | Performed by: FAMILY MEDICINE

## 2019-01-08 PROCEDURE — 83550 IRON BINDING TEST: CPT | Performed by: FAMILY MEDICINE

## 2019-01-08 PROCEDURE — 85027 COMPLETE CBC AUTOMATED: CPT | Performed by: FAMILY MEDICINE

## 2019-01-08 PROCEDURE — 86039 ANTINUCLEAR ANTIBODIES (ANA): CPT | Performed by: FAMILY MEDICINE

## 2019-01-08 PROCEDURE — 85652 RBC SED RATE AUTOMATED: CPT | Performed by: FAMILY MEDICINE

## 2019-01-08 PROCEDURE — 82306 VITAMIN D 25 HYDROXY: CPT | Performed by: FAMILY MEDICINE

## 2019-01-08 PROCEDURE — 83540 ASSAY OF IRON: CPT | Performed by: FAMILY MEDICINE

## 2019-01-08 PROCEDURE — 84443 ASSAY THYROID STIM HORMONE: CPT | Performed by: FAMILY MEDICINE

## 2019-01-08 PROCEDURE — 86038 ANTINUCLEAR ANTIBODIES: CPT | Performed by: FAMILY MEDICINE

## 2019-01-08 NOTE — PROGRESS NOTES
SUBJECTIVE:   Jenny Alcazar is a 38 year old female who presents to clinic today for the following health issues:      Generalized joint Pain    Onset: 3-4 months     Patient came today for evaluation of multiple joint pain mostly in the hands.  Overall feels fatigued and tired.  In the past her hemoglobin was low and she was advised to take iron tablets.  Currently she is not taking any medications.  Her.  Her used to be regular but now she needs to get some intermittent spotting.  Denies any abdominal pain.  No nausea no vomiting.  At the end of the day she feels more fatigued than normal.          Problem list and histories reviewed & adjusted, as indicated.  Additional history: as documented    Patient Active Problem List   Diagnosis     Iron deficiency anemia     Vitamin D deficiency     Estimates she was born 1986.     Female circumcision     Cervicalgia     Past Surgical History:   Procedure Laterality Date     GYN SURGERY      female circumcision      HEMORRHOID SURGERY         Social History     Tobacco Use     Smoking status: Never Smoker     Smokeless tobacco: Never Used   Substance Use Topics     Alcohol use: No     Alcohol/week: 0.0 oz     Family History   Problem Relation Age of Onset     Hypertension Father          Current Outpatient Medications   Medication Sig Dispense Refill     ferrous gluconate (FERGON) 324 (38 Fe) MG tablet TAKE 1 TABLET (324 MG) BY MOUTH DAILY (WITH BREAKFAST) (Patient not taking: Reported on 1/8/2019) 100 tablet 2     naproxen (NAPROSYN) 500 MG tablet Take 1 tablet (500 mg) by mouth 2 times daily as needed for moderate pain (Patient not taking: Reported on 1/8/2019) 30 tablet 0       Reviewed and updated as needed this visit by clinical staff  Tobacco  Allergies  Meds       Reviewed and updated as needed this visit by Provider         ROS:  CONSTITUTIONAL: NEGATIVE for fever, chills, change in weight  ENT/MOUTH: NEGATIVE for ear, mouth and throat problems  RESP: NEGATIVE  for significant cough or SOB  CV: NEGATIVE for chest pain, palpitations or peripheral edema    OBJECTIVE:                                                    BP 94/60   Pulse 64   Temp 97.4  F (36.3  C) (Tympanic)   Wt 66.2 kg (146 lb)   LMP 01/07/2019   BMI 22.20 kg/m    Body mass index is 22.2 kg/m .   GENERAL: healthy, alert, well nourished, well hydrated, no distress  NECK: no tenderness, no adenopathy, no asymmetry, no masses, no stiffness; thyroid- normal to palpation  RESP: lungs clear to auscultation - no rales, no rhonchi, no wheezes  CV: regular rates and rhythm, normal S1 S2, no S3 or S4 and no murmur, no click or rub -  ABDOMEN: soft, no tenderness, no  hepatosplenomegaly, no masses, normal bowel sounds  NEURO: strength and tone- normal, sensory exam- grossly normal, mentation- intact, speech- normal, reflexes- symmetric  Joints not swollen.  No point tenderness.       ASSESSMENT/PLAN:                                                        ICD-10-CM    1. Multiple joint pain M25.50 CBC with platelets     Vitamin D Deficiency     TSH     Comprehensive metabolic panel     ESR: Erythrocyte sedimentation rate     Rheumatoid factor     Anti Nuclear Shonda IgG by IFA with Reflex     Iron and iron binding capacity   2. Other fatigue R53.83 Iron and iron binding capacity       Patient in the past does have history of fatigue tiredness and some iron deficiency.  We will get some blood work including CBC and iron labs and will follow up on that.  Given small joint and symmetrical heart I would get some rheumatological workup as well to make sure there is no underlying condition.  She is advised to follow-up in 1 week to discuss results.  Meanwhile advised to continue hydration.  Take multivitamin a day.  Warning signs were discussed with the patient.    Wesley Sutton MD  [facility] [place]

## 2019-01-09 LAB
ANA PAT SER IF-IMP: ABNORMAL
ANA SER QL IF: ABNORMAL
ANA TITR SER IF: ABNORMAL {TITER}
DEPRECATED CALCIDIOL+CALCIFEROL SERPL-MC: 21 UG/L (ref 20–75)
IRON SATN MFR SERPL: 5 % (ref 15–46)
IRON SERPL-MCNC: 21 UG/DL (ref 35–180)
RHEUMATOID FACT SER NEPH-ACNC: <20 IU/ML (ref 0–20)
TIBC SERPL-MCNC: 430 UG/DL (ref 240–430)
TSH SERPL DL<=0.005 MIU/L-ACNC: 0.51 MU/L (ref 0.4–4)

## 2019-01-10 ENCOUNTER — OFFICE VISIT (OUTPATIENT)
Dept: FAMILY MEDICINE | Facility: CLINIC | Age: 39
End: 2019-01-10
Payer: COMMERCIAL

## 2019-01-10 VITALS
BODY MASS INDEX: 22.05 KG/M2 | DIASTOLIC BLOOD PRESSURE: 60 MMHG | WEIGHT: 145 LBS | SYSTOLIC BLOOD PRESSURE: 100 MMHG | HEART RATE: 60 BPM

## 2019-01-10 DIAGNOSIS — D50.9 IRON DEFICIENCY ANEMIA, UNSPECIFIED IRON DEFICIENCY ANEMIA TYPE: Primary | ICD-10-CM

## 2019-01-10 DIAGNOSIS — E55.9 VITAMIN D DEFICIENCY: ICD-10-CM

## 2019-01-10 DIAGNOSIS — R53.83 OTHER FATIGUE: ICD-10-CM

## 2019-01-10 PROCEDURE — 99213 OFFICE O/P EST LOW 20 MIN: CPT | Performed by: FAMILY MEDICINE

## 2019-01-10 RX ORDER — FERROUS GLUCONATE 324(38)MG
TABLET ORAL
Qty: 100 TABLET | Refills: 5 | Status: SHIPPED | OUTPATIENT
Start: 2019-01-10

## 2019-01-10 NOTE — PROGRESS NOTES
SUBJECTIVE:   Jneny Alcazar is a 38 year old female who presents to clinic today for the following health issues:      Concern - discuss lab results     Patient came today to discuss labs.  Apparently she has history of iron deficiency anemia and fatigue in the past.  She took iron in the past which has helped.  She stopped taking iron tablets and her fatigue tiredness came back.  She denies any other symptoms.      Problem list and histories reviewed & adjusted, as indicated.  Additional history: as documented      Patient Active Problem List   Diagnosis     Iron deficiency anemia     Vitamin D deficiency     Estimates she was born 1986.     Female circumcision     Cervicalgia     Past Surgical History:   Procedure Laterality Date     GYN SURGERY      female circumcision      HEMORRHOID SURGERY         Social History     Tobacco Use     Smoking status: Never Smoker     Smokeless tobacco: Never Used   Substance Use Topics     Alcohol use: No     Alcohol/week: 0.0 oz     Family History   Problem Relation Age of Onset     Hypertension Father          Current Outpatient Medications   Medication Sig Dispense Refill     ferrous gluconate (FERGON) 324 (38 Fe) MG tablet TAKE 1 TABLET (324 MG) BY MOUTH  2 times daily. 100 tablet 5       Reviewed and updated as needed this visit by clinical staff  Tobacco  Allergies  Meds       Reviewed and updated as needed this visit by Provider         ROS:  CONSTITUTIONAL: NEGATIVE for fever, chills, change in weight  ENT/MOUTH: NEGATIVE for ear, mouth and throat problems  RESP: NEGATIVE for significant cough or SOB  CV: NEGATIVE for chest pain, palpitations or peripheral edema    OBJECTIVE:                                                    /60   Pulse 60   Wt 65.8 kg (145 lb)   LMP 01/07/2019   BMI 22.05 kg/m    Body mass index is 22.05 kg/m .   GENERAL: healthy, alert, well nourished, well hydrated, no distress  NECK: no tenderness, no adenopathy, no asymmetry, no masses,  no stiffness; thyroid- normal to palpation  RESP: lungs clear to auscultation - no rales, no rhonchi, no wheezes  CV: regular rates and rhythm, normal S1 S2, no S3 or S4 and no murmur, no click or rub -         ASSESSMENT/PLAN:                                                        ICD-10-CM    1. Iron deficiency anemia, unspecified iron deficiency anemia type D50.9 ferrous gluconate (FERGON) 324 (38 Fe) MG tablet   2. Other fatigue R53.83      Discuss labs with the patient.  She continued to have low iron and her hemoglobin is low at MCV is also low.  She has iron deficiency anemia.  Her symptoms of fatigue tiredness could be due to that.  I would suggest to start taking iron tablets morning and evening for the next 2 months and follow-up for lab recheck.  To discuss other labs including rheumatoid factor which are normal and ESR.  Her JAYRO is borderline positive this could be false positive I would suggest we will check that again in her 2-3 months lab and see if there is any change.  Increase green vegetables and protein meat products in your diet.  Follow-up if any change in symptoms.  Side effects were discussed with the patient.      eWsley Sutton MD  [facility] [place]

## 2019-01-11 RX ORDER — CHOLECALCIFEROL (VITAMIN D3) 25 MCG
TABLET ORAL
Qty: 100 TABLET | Refills: 3 | Status: SHIPPED | OUTPATIENT
Start: 2019-01-11

## 2019-01-11 NOTE — TELEPHONE ENCOUNTER
"Requested Prescriptions   Pending Prescriptions Disp Refills     VITAMIN D3 1000 units tablet [Pharmacy Med Name: VITAMIN D3 1,000 UNIT TABLET]  Last Written Prescription Date:  4/13/17  Last Fill Quantity: 100,  # refills: 3   Last office visit: 1/10/2019 with prescribing provider:  Lesley   Future Office Visit:     100 tablet 3     Sig: TAKE 6 TABS BY MOUTH ONCE DAILY FOR 6 WEEKS, THEN 2 TABS DAILY    Vitamin Supplements (Adult) Protocol Failed - 1/10/2019  8:03 PM       Failed - Medication is active on med list       Passed - High dose Vitamin D not ordered       Passed - Recent (12 mo) or future (30 days) visit within the authorizing provider's specialty    Patient had office visit in the last 12 months or has a visit in the next 30 days with authorizing provider or within the authorizing provider's specialty.  See \"Patient Info\" tab in inbasket, or \"Choose Columns\" in Meds & Orders section of the refill encounter.                "

## 2019-06-14 ENCOUNTER — OFFICE VISIT (OUTPATIENT)
Dept: DERMATOLOGY | Facility: CLINIC | Age: 39
End: 2019-06-14
Payer: COMMERCIAL

## 2019-06-14 DIAGNOSIS — L81.1 MELASMA: Primary | ICD-10-CM

## 2019-06-14 RX ORDER — HYDROQUINONE 40 MG/G
CREAM TOPICAL
Qty: 56.6 G | Refills: 0 | Status: SHIPPED | OUTPATIENT
Start: 2019-06-14 | End: 2019-09-11

## 2019-06-14 ASSESSMENT — PAIN SCALES - GENERAL: PAINLEVEL: NO PAIN (0)

## 2019-06-14 NOTE — PROGRESS NOTES
[facility] Dermatology Note      Dermatology Problem List:  1.Melasma- Sunscreen. Start Hydroquinone 4% cream     Encounter Date: Jun 14, 2019    CC:  Chief Complaint   Patient presents with     Derm Problem     Jenny is here today to be seen for discoloration on her face.          History of Present Illness:  Ms. Jenny Alcazar is a 38 year old female who is new to the dermatology clinic present for discoloration of the skin. At today's visit patient started noticing the discoloration in 2016. Notes that the discoloration is only on her cheeks and nose. Notes that the discoloration started off as a small patch on the nose but has spread to her cheeks. She noticed the discoloration got worse after on vacation to [facility]. Patient states she has always had dry skin and she uses various oils and topical creams to help with the itching on th face. She has not recently been or ever been pregnant. Does not use OCPs or exogenous estrogens. She has been using OTC vit E oil moisturizer and Tumeric powder. The patient denies painful, itching, tingling or bleeding lesions unless otherwise noted.      Past Medical History:   Patient Active Problem List   Diagnosis     Iron deficiency anemia     Vitamin D deficiency     Estimates she was born 1986.     Female circumcision     Cervicalgia     Past Medical History:   Diagnosis Date     No active medical problems      Past Surgical History:   Procedure Laterality Date     GYN SURGERY      female circumcision      HEMORRHOID SURGERY         Social History:   reports that she has never smoked. She has never used smokeless tobacco. She reports that she does not drink alcohol or use drugs.    Family History:  Family History   Problem Relation Age of Onset     Hypertension Father        Medications:  Current Outpatient Medications   Medication Sig Dispense Refill     ferrous gluconate (FERGON) 324 (38 Fe) MG tablet TAKE 1 TABLET (324 MG) BY MOUTH  2 times daily. 100  tablet 5     VITAMIN D3 1000 units tablet TAKE 6 TABS BY MOUTH ONCE DAILY FOR 6 WEEKS, THEN 2 TABS DAILY 100 tablet 3       No Known Allergies    Review of Systems:  -Constitutional: The patient denies fatigue, fevers, chills, unintended weight loss, and night sweats.  -HEENT: Patient denies nonhealing oral sores.  -Skin: As above in HPI. No additional skin concerns.    Physical exam:  Vitals: There were no vitals taken for this visit.  GEN: This is a well developed, well-nourished female in no acute distress, in a pleasant mood.    SKIN: Focused examination of the face was performed.  -Patchy hyperpigmentation over the bridge of the nose and convex surface of the cheeks  -No other lesions of concern on areas examined.       Impression/Plan:  1. Melasma - convex surfaces of cheeks and nose    Educated on the etiology    Start Hydroquinone 4% cream, apply topically BID to the face. Use for no longer than 12 consecutive weeks. Discussed risk of additional hyperpigmentation and patient is to stop if this occurs    Discussed importance of daily sunscreen use and reapplication. Sunscreen: Apply 20 minutes prior to going outdoors and reapply every two hours, when wet or sweating. We recommend using an SPF 30 or higher, and to use one that is water resistant.       Recommended CeraVe moisturizer, Cetaphil cleanser, and Elta MD      CC Dr. Bohjanen on close of this encounter.  Follow-up in 3 months, earlier for new or changing lesions.       Staff Involved:  Staff/Scribe    Scribe Disclosure:  I, Gaston Meneses, am serving as a scribe to document services personally performed by Magalie Rico PA-C, based on data collection and the provider's statements to me.     Provider Disclosure:   The documentation recorded by the scribe accurately reflects the services I personally performed and the decisions made by me.    All risks, benefits and alternatives were discussed with patient.  Patient is in agreement and understands the  assessment and plan.  All questions were answered.    Magalie Rico PA-C  [facility] [place]: Phone: [phone], Fax: [phone]

## 2019-06-14 NOTE — NURSING NOTE
Dermatology Rooming Note    Jenny Alcazar's goals for this visit include:   Chief Complaint   Patient presents with     Derm Problem     Jenny is here today to be seen for discoloration on her face.      JOSE Naidu

## 2019-06-14 NOTE — LETTER
6/14/2019       RE: Jenny Alcazar  [address]  [place] [address]     Dear Colleague,    Thank you for referring your patient, Jenny Alcazar, to the [facility] DERMATOLOGY at [facility]. Please see a copy of my visit note below.    [facility] Dermatology Note      Dermatology Problem List:  1.Melasma- Sunscreen. Start Hydroquinone 4% cream     Encounter Date: Jun 14, 2019    CC:  Chief Complaint   Patient presents with     Derm Problem     Jenny is here today to be seen for discoloration on her face.          History of Present Illness:  Ms. Jenny Alcazar is a 38 year old female who is new to the dermatology clinic present for discoloration of the skin. At today's visit patient started noticing the discoloration in 2016. Notes that the discoloration is only on her cheeks and nose. Notes that the discoloration started off as a small patch on the nose but has spread to her cheeks. She noticed the discoloration got worse after on vacation to [facility]. Patient states she has always had dry skin and she uses various oils and topical creams to help with the itching on th face. She has not recently been or ever been pregnant. Does not use OCPs or exogenous estrogens. She has been using OTC vit E oil moisturizer and Tumeric powder. The patient denies painful, itching, tingling or bleeding lesions unless otherwise noted.      Past Medical History:   Patient Active Problem List   Diagnosis     Iron deficiency anemia     Vitamin D deficiency     Estimates she was born 1986.     Female circumcision     Cervicalgia     Past Medical History:   Diagnosis Date     No active medical problems      Past Surgical History:   Procedure Laterality Date     GYN SURGERY      female circumcision      HEMORRHOID SURGERY         Social History:   reports that she has never smoked. She has never used smokeless tobacco. She reports that she does not drink alcohol or use  drugs.    Family History:  Family History   Problem Relation Age of Onset     Hypertension Father        Medications:  Current Outpatient Medications   Medication Sig Dispense Refill     ferrous gluconate (FERGON) 324 (38 Fe) MG tablet TAKE 1 TABLET (324 MG) BY MOUTH  2 times daily. 100 tablet 5     VITAMIN D3 1000 units tablet TAKE 6 TABS BY MOUTH ONCE DAILY FOR 6 WEEKS, THEN 2 TABS DAILY 100 tablet 3       No Known Allergies    Review of Systems:  -Constitutional: The patient denies fatigue, fevers, chills, unintended weight loss, and night sweats.  -HEENT: Patient denies nonhealing oral sores.  -Skin: As above in HPI. No additional skin concerns.    Physical exam:  Vitals: There were no vitals taken for this visit.  GEN: This is a well developed, well-nourished female in no acute distress, in a pleasant mood.    SKIN: Focused examination of the face was performed.  -Patchy hyperpigmentation over the bridge of the nose and convex surface of the cheeks  -No other lesions of concern on areas examined.       Impression/Plan:  1. Melasma - convex surfaces of cheeks and nose    Educated on the etiology    Start Hydroquinone 4% cream, apply topically BID to the face. Use for no longer than 12 consecutive weeks. Discussed risk of additional hyperpigmentation and patient is to stop if this occurs    Discussed importance of daily sunscreen use and reapplication. Sunscreen: Apply 20 minutes prior to going outdoors and reapply every two hours, when wet or sweating. We recommend using an SPF 30 or higher, and to use one that is water resistant.       Recommended CeraVe moisturizer, Cetaphil cleanser, and Elta MD      CC Dr. Pulido on close of this encounter.  Follow-up in 3 months, earlier for new or changing lesions.       Staff Involved:  Staff/Scribe    Scribe Disclosure:  Gaston ENCARNACION, am serving as a scribe to document services personally performed by Magalie Rico PA-C, based on data collection and the provider's  statements to me.     Provider Disclosure:   The documentation recorded by the scribe accurately reflects the services I personally performed and the decisions made by me.    All risks, benefits and alternatives were discussed with patient.  Patient is in agreement and understands the assessment and plan.  All questions were answered.    Magalie Rico PA-C  [facility]: Phone: [phone], Fax: [phone]                                  Pictures were placed in Pt's chart today for future reference.

## 2019-09-11 ENCOUNTER — OFFICE VISIT (OUTPATIENT)
Dept: DERMATOLOGY | Facility: CLINIC | Age: 39
End: 2019-09-11
Payer: COMMERCIAL

## 2019-09-11 DIAGNOSIS — L81.1 MELASMA: Primary | ICD-10-CM

## 2019-09-11 RX ORDER — HYDROQUINONE 40 MG/G
CREAM TOPICAL
Qty: 56.6 G | Refills: 0 | Status: SHIPPED | OUTPATIENT
Start: 2019-09-11

## 2019-09-11 ASSESSMENT — PAIN SCALES - GENERAL: PAINLEVEL: NO PAIN (0)

## 2019-09-11 NOTE — NURSING NOTE
Dermatology Rooming Note    Jenny Alcazar's goals for this visit include:   Chief Complaint   Patient presents with     Derm Problem     Jenny is here today for a malasma follow up.        JOSE Naidu

## 2019-09-11 NOTE — LETTER
9/11/2019       RE: Jenny Alcazar  [address]  [place] [address]     Dear Colleague,    Thank you for referring your patient, Jenny Alcazar, to the [facility] DERMATOLOGY at [facility]. Please see a copy of my visit note below.    [facility] Dermatology Note      Dermatology Problem List:  1.Melasma - current tx: Phyto+ (Kojic acid), tretinoin 0.025% cream, strict sun protection  Previous tx: hydroquinone 4% (used for 12 weeks with minimal benefit)    CC:   Chief Complaint   Patient presents with     Derm Problem     Jenny is here today for a malasma follow up.          Encounter Date: Sep 11, 2019    History of Present Illness:  Ms. Jenny Alcazar is a 38 year old female who returns to the dermatology department regarding melasma on the nose and bilateral cheeks. She has been using hydroquinone 4% daily fo about the past 12 weeks. She thinks it has improved a little bit, but not drastically. She is not currently pregnat and not seeking pregnancy. She has not recently been pregnant (has never been pregnant) and is not taking any exogenous estrogens. She has been wearing sunscreen SPF 30+, but had not been previously used sunscreen. Initally this started on her nose and then spread to her cheeks. Noticed it while on vacation to [place].     Past Medical History:   Patient Active Problem List   Diagnosis     Iron deficiency anemia     Vitamin D deficiency     Estimates she was born 1986.     Female circumcision     Cervicalgia     Past Medical History:   Diagnosis Date     No active medical problems      Past Surgical History:   Procedure Laterality Date     GYN SURGERY      female circumcision      HEMORRHOID SURGERY         Social History:  reports that she has never smoked. She has never used smokeless tobacco. She reports that she does not drink alcohol or use drugs.    Family History:  Not obtained today.    Medications:  Current Outpatient  Medications   Medication Sig Dispense Refill     ferrous gluconate (FERGON) 324 (38 Fe) MG tablet TAKE 1 TABLET (324 MG) BY MOUTH  2 times daily. 100 tablet 5     hydroquinone (PINKY) 4 % external cream Apply topically BID to the face. Use for no longer than 12 consecutive weeks. 56.6 g 0     VITAMIN D3 1000 units tablet TAKE 6 TABS BY MOUTH ONCE DAILY FOR 6 WEEKS, THEN 2 TABS DAILY 100 tablet 3     No Known Allergies      Review of Systems:  -Constitutional: The patient denies fatigue, fevers, chills, unintended weight loss, and night sweats.  -HEENT: Patient denies nonhealing oral sores.  -Skin: As above in HPI. No additional skin concerns.    Physical exam:  Vitals: There were no vitals taken for this visit.  GEN: This is a well developed, well-nourished female in no acute distress, in a pleasant mood.    SKIN: Focused examination of the face and neck was performed.  -Patchy hyperpigmentation over the bridge of the nose and convex surface of the bilateral cheeks, only very slighly improved since last visit  -No other lesions of concern on areas examined.     Impression/Plan:  1. Melasma    Start kojic acid ([employer] Phyto+)    Start tretinoin 0.025% cream at bedtime - edu on potential side effects    Wait 1-2mo, the may restart hydroquinone 4% daily to entire face QAM    Edu on chemical peels, consent signed. Patient to consider at [facility].    Advised to avoid laser treatments    Continue with strict sun protection    Patient is not currently pregnant and does not plan to get pregnant. Advised to discontinue use if she chooses to seek pregnancy    CC Dr. Ricketts on close of this encounter.  Follow-up prn for new or changing lesions.       Staff Involved:  Staff Only  All risks, benefits and alternatives were discussed with patient.  Patient is in agreement and understands the assessment and plan.  All questions were answered.    Magalie Rico PA-C  [facility]  [facility]: Phone: [phone], Fax: [phone]

## 2019-09-14 RX ORDER — TRETINOIN 0.25 MG/G
CREAM TOPICAL
Qty: 45 G | Refills: 1 | Status: SHIPPED | OUTPATIENT
Start: 2019-09-14

## 2019-09-14 NOTE — PROGRESS NOTES
[facility] Dermatology Note      Dermatology Problem List:  1.Melasma - current tx: Phyto+ (Kojic acid), tretinoin 0.025% cream, strict sun protection  Previous tx: hydroquinone 4% (used for 12 weeks with minimal benefit)    CC:   Chief Complaint   Patient presents with     Derm Problem     Jenny is here today for a malasma follow up.          Encounter Date: Sep 11, 2019    History of Present Illness:  Ms. Jenny Alcazar is a 38 year old female who returns to the dermatology department regarding melasma on the nose and bilateral cheeks. She has been using hydroquinone 4% daily fo about the past 12 weeks. She thinks it has improved a little bit, but not drastically. She is not currently pregnat and not seeking pregnancy. She has not recently been pregnant (has never been pregnant) and is not taking any exogenous estrogens. She has been wearing sunscreen SPF 30+, but had not been previously used sunscreen. Initally this started on her nose and then spread to her cheeks. Noticed it while on vacation to [place].     Past Medical History:   Patient Active Problem List   Diagnosis     Iron deficiency anemia     Vitamin D deficiency     Estimates she was born 1986.     Female circumcision     Cervicalgia     Past Medical History:   Diagnosis Date     No active medical problems      Past Surgical History:   Procedure Laterality Date     GYN SURGERY      female circumcision      HEMORRHOID SURGERY         Social History:  reports that she has never smoked. She has never used smokeless tobacco. She reports that she does not drink alcohol or use drugs.    Family History:  Not obtained today.    Medications:  Current Outpatient Medications   Medication Sig Dispense Refill     ferrous gluconate (FERGON) 324 (38 Fe) MG tablet TAKE 1 TABLET (324 MG) BY MOUTH  2 times daily. 100 tablet 5     hydroquinone (PINKY) 4 % external cream Apply topically BID to the face. Use for no longer than 12 consecutive weeks. 56.6  g 0     VITAMIN D3 1000 units tablet TAKE 6 TABS BY MOUTH ONCE DAILY FOR 6 WEEKS, THEN 2 TABS DAILY 100 tablet 3     No Known Allergies      Review of Systems:  -Constitutional: The patient denies fatigue, fevers, chills, unintended weight loss, and night sweats.  -HEENT: Patient denies nonhealing oral sores.  -Skin: As above in HPI. No additional skin concerns.    Physical exam:  Vitals: There were no vitals taken for this visit.  GEN: This is a well developed, well-nourished female in no acute distress, in a pleasant mood.    SKIN: Focused examination of the face and neck was performed.  -Patchy hyperpigmentation over the bridge of the nose and convex surface of the bilateral cheeks, only very slighly improved since last visit  -No other lesions of concern on areas examined.     Impression/Plan:  1. Melasma    Start kojic acid ([employer] Phyto+)    Start tretinoin 0.025% cream at bedtime - edu on potential side effects    Wait 1-2mo, the may restart hydroquinone 4% daily to entire face QAM    Edu on chemical peels, consent signed. Patient to [facility] at [facility].    Advised to avoid laser treatments    Continue with strict sun protection    Patient is not currently pregnant and does not plan to get pregnant. Advised to discontinue use if she chooses to seek pregnancy    CC Dr. Ricketts on close of this encounter.  Follow-up prn for new or changing lesions.       Staff Involved:  Staff Only  All risks, benefits and alternatives were discussed with patient.  Patient is in agreement and understands the assessment and plan.  All questions were answered.    Magalie Rico PA-C  [facility]: Phone: [phone], Fax: [phone]

## 2020-11-02 ENCOUNTER — OFFICE VISIT (OUTPATIENT)
Dept: FAMILY MEDICINE | Facility: CLINIC | Age: 40
End: 2020-11-02
Payer: COMMERCIAL

## 2020-11-02 VITALS
HEIGHT: 68 IN | DIASTOLIC BLOOD PRESSURE: 68 MMHG | OXYGEN SATURATION: 99 % | BODY MASS INDEX: 24.4 KG/M2 | WEIGHT: 161 LBS | SYSTOLIC BLOOD PRESSURE: 92 MMHG | TEMPERATURE: 98.2 F | HEART RATE: 73 BPM

## 2020-11-02 DIAGNOSIS — R42 DIZZINESS: Primary | ICD-10-CM

## 2020-11-02 DIAGNOSIS — Z32.01 PREGNANCY TEST POSITIVE: ICD-10-CM

## 2020-11-02 DIAGNOSIS — N92.6 MISSED PERIOD: ICD-10-CM

## 2020-11-02 LAB
BASOPHILS # BLD AUTO: 0 10E9/L (ref 0–0.2)
BASOPHILS NFR BLD AUTO: 0.5 %
DIFFERENTIAL METHOD BLD: ABNORMAL
EOSINOPHIL # BLD AUTO: 0.5 10E9/L (ref 0–0.7)
EOSINOPHIL NFR BLD AUTO: 7.7 %
ERYTHROCYTE [DISTWIDTH] IN BLOOD BY AUTOMATED COUNT: 17.3 % (ref 10–15)
HCG UR QL: POSITIVE
HCT VFR BLD AUTO: 35.7 % (ref 35–47)
HGB BLD-MCNC: 11.4 G/DL (ref 11.7–15.7)
LYMPHOCYTES # BLD AUTO: 1.6 10E9/L (ref 0.8–5.3)
LYMPHOCYTES NFR BLD AUTO: 24.5 %
MCH RBC QN AUTO: 25.6 PG (ref 26.5–33)
MCHC RBC AUTO-ENTMCNC: 31.9 G/DL (ref 31.5–36.5)
MCV RBC AUTO: 80 FL (ref 78–100)
MONOCYTES # BLD AUTO: 0.7 10E9/L (ref 0–1.3)
MONOCYTES NFR BLD AUTO: 10.2 %
NEUTROPHILS # BLD AUTO: 3.7 10E9/L (ref 1.6–8.3)
NEUTROPHILS NFR BLD AUTO: 57.1 %
PLATELET # BLD AUTO: 240 10E9/L (ref 150–450)
RBC # BLD AUTO: 4.46 10E12/L (ref 3.8–5.2)
WBC # BLD AUTO: 6.5 10E9/L (ref 4–11)

## 2020-11-02 PROCEDURE — 80050 GENERAL HEALTH PANEL: CPT | Performed by: NURSE PRACTITIONER

## 2020-11-02 PROCEDURE — 81025 URINE PREGNANCY TEST: CPT | Performed by: NURSE PRACTITIONER

## 2020-11-02 PROCEDURE — 36415 COLL VENOUS BLD VENIPUNCTURE: CPT | Performed by: NURSE PRACTITIONER

## 2020-11-02 PROCEDURE — 99214 OFFICE O/P EST MOD 30 MIN: CPT | Performed by: NURSE PRACTITIONER

## 2020-11-02 RX ORDER — MECLIZINE HYDROCHLORIDE 25 MG/1
25 TABLET ORAL 3 TIMES DAILY PRN
Qty: 20 TABLET | Refills: 1 | Status: SHIPPED | OUTPATIENT
Start: 2020-11-02

## 2020-11-02 RX ORDER — PRENATAL VIT/IRON FUM/FOLIC AC 27MG-0.8MG
1 TABLET ORAL DAILY
Qty: 90 TABLET | Refills: 3 | Status: SHIPPED | OUTPATIENT
Start: 2020-11-02

## 2020-11-02 ASSESSMENT — MIFFLIN-ST. JEOR: SCORE: 1443.79

## 2020-11-02 NOTE — LETTER
November 3, 2020      Jenny Alcazar  [address]  [place] [address]        Dear ,    We are writing to inform you of your test results.    Enclosed is a copy of your recent lab work.    Resulted Orders   CBC with platelets and differential   Result Value Ref Range    WBC 6.5 4.0 - 11.0 10e9/L    RBC Count 4.46 3.8 - 5.2 10e12/L    Hemoglobin 11.4 (L) 11.7 - 15.7 g/dL    Hematocrit 35.7 35.0 - 47.0 %    MCV 80 78 - 100 fl    MCH 25.6 (L) 26.5 - 33.0 pg    MCHC 31.9 31.5 - 36.5 g/dL    RDW 17.3 (H) 10.0 - 15.0 %    Platelet Count 240 150 - 450 10e9/L    Diff Method Automated Method     % Neutrophils 57.1 %    % Lymphocytes 24.5 %    % Monocytes 10.2 %    % Eosinophils 7.7 %    % Basophils 0.5 %    Absolute Neutrophil 3.7 1.6 - 8.3 10e9/L    Absolute Lymphocytes 1.6 0.8 - 5.3 10e9/L    Absolute Monocytes 0.7 0.0 - 1.3 10e9/L    Absolute Eosinophils 0.5 0.0 - 0.7 10e9/L    Absolute Basophils 0.0 0.0 - 0.2 10e9/L   TSH with free T4 reflex   Result Value Ref Range    TSH 0.61 0.40 - 4.00 mU/L   Comprehensive metabolic panel   Result Value Ref Range    Sodium 136 133 - 144 mmol/L    Potassium 4.2 3.4 - 5.3 mmol/L    Chloride 109 94 - 109 mmol/L    Carbon Dioxide 24 20 - 32 mmol/L    Anion Gap 3 3 - 14 mmol/L    Glucose 92 70 - 99 mg/dL      Comment:      Non Fasting    Urea Nitrogen 11 7 - 30 mg/dL    Creatinine 0.59 0.52 - 1.04 mg/dL    GFR Estimate >90 >60 mL/min/[1.73_m2]      Comment:      Non  GFR Calc  Starting 12/18/2018, serum creatinine based estimated GFR (eGFR) will be   calculated using the Chronic Kidney Disease Epidemiology Collaboration   (CKD-EPI) equation.      GFR Estimate If Black >90 >60 mL/min/[1.73_m2]      Comment:       GFR Calc  Starting 12/18/2018, serum creatinine based estimated GFR (eGFR) will be   calculated using the Chronic Kidney Disease Epidemiology Collaboration   (CKD-EPI) equation.      Calcium 8.5 8.5 - 10.1 mg/dL    Bilirubin  Total 0.3 0.2 - 1.3 mg/dL    Albumin 3.4 3.4 - 5.0 g/dL    Protein Total 7.1 6.8 - 8.8 g/dL    Alkaline Phosphatase 51 40 - 150 U/L    ALT 18 0 - 50 U/L    AST 17 0 - 45 U/L   HCG Qual, Urine ([id number])   Result Value Ref Range    HCG Qual Urine Positive (A) NEG^Negative      Comment:      This test is for screening purposes.  Results should be interpreted along with   the clinical picture.  Confirmation testing is available if warranted by   ordering [id number], HCG Quantitative Pregnancy.         If you have any questions or concerns, please call the clinic at the number listed above.       Sincerely,    Willy Carrington NP

## 2020-11-02 NOTE — PATIENT INSTRUCTIONS
The Epley maneuver is performed as follows:  You are seated, and the doctor turns your head 45 degrees horizontally toward the affected ear. You should hold the doctor's arms for support.  The doctor tilts you backward to a horizontal position with your head kept in place at a 45-degree turn, hanging. An attack of vertigo is likely as the debris moves toward the apex of the canal. You are held in this position until the vertigo stops, usually within a minute.  The doctor turns your head 90 degrees toward the unaffected ear. The doctor then rolls you onto the side of the unaffected ear, so that you are now looking at the floor. The debris should move in the canal again, possibly provoking another attack of vertigo. You should remain in this position until the vertigo stops, usually within a minute.  The doctor helps you back to a seated position.      The Semont maneuver is performed as follows:  You are seated, and the doctor turns your head so that it is shelter between looking straight ahead and looking away from the side that causes the worst vertigo.  The doctor then lowers you quickly to the side that causes the worst vertigo. When your head is on the table, you are looking up at the ceiling. The doctor holds you in this position for 30 seconds.  The doctor then quickly moves you to the other side of the table without stopping in the upright position. When your head is on the table, you are now looking down at the table. The doctor holds you in this position for 30 seconds.  The doctor then helps you sit back up.

## 2020-11-02 NOTE — PROGRESS NOTES
"Subjective     Jenny Alcazar is a 39 year old female who presents to clinic today for the following health issues:    HPI         Dizziness/Headache -   Onset/Duration: started yesterday  Description:   Do you feel faint: YES  Does it feel like the surroundings (bed, room) are moving: YES  Unsteady/off balance: YES  Have you passed out or fallen: no  Intensity: moderate  Progression of Symptoms: same  Accompanying Signs & Symptoms:  Heart palpitations or chest pain: no  Nausea, vomiting: YES, nausea  Weakness or lack of coordination in arms or legs: no  Vision or speech changes: no  Numbness or tingling: no  Ringing in ears (Tinnitus): no  Hearing Loss: no  History:   Head trauma/concussion history: no  Previous similar symptoms: no  Recent bleeding history: no  Any new medications (BP?): no   Precipitating factors:  Also worse when she is laying down  Worse with activity: yes  Worse with head movement: YES  Alleviating factors:   Does staying in a fixed position give relief: no   Therapies tried and outcome: None    HPI: Jenny presents today with the complaints of dizziness, fatigue, and nausea. Worse when laying down (\"as soon as my head hits the period, I feel like the room is spinning\"). Also bad when she moves / stands up from sitting. She also notes nausea (no vomiting). She has a history of this (in [place] earlier this year). She is six days late for her menstrual period. No chest pain / pressure, diaphoresis, SOB. She had COVID in late August but has fully recovered. History of iron deficiency anemia.     Review of Systems   Constitutional, HEENT, cardiovascular, pulmonary, GI, neuro, endocrine systems are negative, except as otherwise noted.      Objective    BP 92/68 (BP Location: Right arm, Cuff Size: Adult Regular)   Pulse 73   Temp 98.2  F (36.8  C) (Tympanic)   Ht 1.727 m (5' 8\")   Wt 73 kg (161 lb)   SpO2 99%   BMI 24.48 kg/m    Body mass index is 24.48 kg/m .  Physical Exam   GENERAL: healthy, " alert and no distress  RESP: lungs clear to auscultation - no rales, rhonchi or wheezes  CV: regular rate and rhythm, normal S1 S2, no S3 or S4, no murmur, click or rub, no peripheral edema and peripheral pulses strong  ABDOMEN: soft, nontender, no hepatosplenomegaly, no masses and bowel sounds normal  NEURO: Normal strength and tone, mentation intact and speech normal; CN II - XII grossly intact.   PSYCH: mentation appears normal, affect normal/bright    Results for orders placed or performed in visit on 11/02/20 (from the past 24 hour(s))   CBC with platelets and differential   Result Value Ref Range    WBC 6.5 4.0 - 11.0 10e9/L    RBC Count 4.46 3.8 - 5.2 10e12/L    Hemoglobin 11.4 (L) 11.7 - 15.7 g/dL    Hematocrit 35.7 35.0 - 47.0 %    MCV 80 78 - 100 fl    MCH 25.6 (L) 26.5 - 33.0 pg    MCHC 31.9 31.5 - 36.5 g/dL    RDW 17.3 (H) 10.0 - 15.0 %    Platelet Count 240 150 - 450 10e9/L    Diff Method Automated Method     % Neutrophils 57.1 %    % Lymphocytes 24.5 %    % Monocytes 10.2 %    % Eosinophils 7.7 %    % Basophils 0.5 %    Absolute Neutrophil 3.7 1.6 - 8.3 10e9/L    Absolute Lymphocytes 1.6 0.8 - 5.3 10e9/L    Absolute Monocytes 0.7 0.0 - 1.3 10e9/L    Absolute Eosinophils 0.5 0.0 - 0.7 10e9/L    Absolute Basophils 0.0 0.0 - 0.2 10e9/L   HCG Qual, Urine ([id number])   Result Value Ref Range    HCG Qual Urine Positive (A) NEG^Negative           Assessment & Plan     Jenny was seen today for headache and dizziness. Symptoms consistent with BPPV. However, CBC reveals ongoing mild iron deficiency anemia, and pregnancy test is positive, so it is unclear if these are contributors to her symptoms. Will have her establish care with OB/GYN and start prenatal (with iron) vitamins. I have also recommended that she resume her prior iron supplements every other day. Will have her trial prn meclizine, as well, for breakthrough dizziness. No red flags to suggest primary neurogenic or cardiogenic dizziness today. Discussed  reasons to call or return to clinic. Jenny acknowledges and demonstrates understanding of circumstances under which care should be sought urgently or emergently. Follow up as discussed. Discussed risks, benefits, alternatives, potential side effects, and proper administration of new medication / treatment. Agrees with plan of care. All questions answered.     Diagnoses and all orders for this visit:    Dizziness  -     CBC with platelets and differential  -     TSH with free T4 reflex  -     Comprehensive metabolic panel  -     HCG Qual, Urine ([id number])  -     meclizine (ANTIVERT) 25 MG tablet; Take 1 tablet (25 mg) by mouth 3 times daily as needed for dizziness    Missed period  -     HCG Qual, Urine ([id number])    Pregnancy test positive  -     Prenatal Vit-Fe Fumarate-FA (PRENATAL MULTIVITAMIN W/IRON) 27-0.8 MG tablet; Take 1 tablet by mouth daily            See Patient Instructions    Return in about 2 days (around 11/4/2020) for persistent or worsening symptoms.    Willy Carrington NP  [facility]

## 2020-11-03 LAB
ALBUMIN SERPL-MCNC: 3.4 G/DL (ref 3.4–5)
ALP SERPL-CCNC: 51 U/L (ref 40–150)
ALT SERPL W P-5'-P-CCNC: 18 U/L (ref 0–50)
ANION GAP SERPL CALCULATED.3IONS-SCNC: 3 MMOL/L (ref 3–14)
AST SERPL W P-5'-P-CCNC: 17 U/L (ref 0–45)
BILIRUB SERPL-MCNC: 0.3 MG/DL (ref 0.2–1.3)
BUN SERPL-MCNC: 11 MG/DL (ref 7–30)
CALCIUM SERPL-MCNC: 8.5 MG/DL (ref 8.5–10.1)
CHLORIDE SERPL-SCNC: 109 MMOL/L (ref 94–109)
CO2 SERPL-SCNC: 24 MMOL/L (ref 20–32)
CREAT SERPL-MCNC: 0.59 MG/DL (ref 0.52–1.04)
GFR SERPL CREATININE-BSD FRML MDRD: >90 ML/MIN/{1.73_M2}
GLUCOSE SERPL-MCNC: 92 MG/DL (ref 70–99)
POTASSIUM SERPL-SCNC: 4.2 MMOL/L (ref 3.4–5.3)
PROT SERPL-MCNC: 7.1 G/DL (ref 6.8–8.8)
SODIUM SERPL-SCNC: 136 MMOL/L (ref 133–144)
TSH SERPL DL<=0.005 MIU/L-ACNC: 0.61 MU/L (ref 0.4–4)

## 2024-07-02 ENCOUNTER — TRANSFERRED RECORDS (OUTPATIENT)
Dept: HEALTH INFORMATION MANAGEMENT | Facility: CLINIC | Age: 44
End: 2024-07-02

## 2024-08-18 ENCOUNTER — TRANSFERRED RECORDS (OUTPATIENT)
Dept: HEALTH INFORMATION MANAGEMENT | Facility: CLINIC | Age: 44
End: 2024-08-18

## 2024-08-22 ENCOUNTER — TRANSCRIBE ORDERS (OUTPATIENT)
Dept: MATERNAL FETAL MEDICINE | Facility: CLINIC | Age: 44
End: 2024-08-22

## 2024-08-22 DIAGNOSIS — Z31.69 ENCOUNTER FOR PRECONCEPTION CONSULTATION: Primary | ICD-10-CM

## 2024-08-23 ENCOUNTER — CARE COORDINATION (OUTPATIENT)
Dept: MATERNAL FETAL MEDICINE | Facility: CLINIC | Age: 44
End: 2024-08-23

## 2024-08-23 ENCOUNTER — TELEPHONE (OUTPATIENT)
Dept: MATERNAL FETAL MEDICINE | Facility: CLINIC | Age: 44
End: 2024-08-23

## 2024-08-23 NOTE — TELEPHONE ENCOUNTER
Return call from patient. States she previously was seen with Chikis Dennison, and name is under Jenny Alcazar same . Call placed to have charts merged.

## 2024-08-26 DIAGNOSIS — Z31.69 ENCOUNTER FOR PRECONCEPTION CONSULTATION: Primary | ICD-10-CM

## 2025-01-27 ENCOUNTER — PRE VISIT (OUTPATIENT)
Dept: MATERNAL FETAL MEDICINE | Facility: CLINIC | Age: 45
End: 2025-01-27